# Patient Record
Sex: FEMALE | Race: WHITE | NOT HISPANIC OR LATINO | Employment: FULL TIME | URBAN - METROPOLITAN AREA
[De-identification: names, ages, dates, MRNs, and addresses within clinical notes are randomized per-mention and may not be internally consistent; named-entity substitution may affect disease eponyms.]

---

## 2017-01-06 ENCOUNTER — HOSPITAL ENCOUNTER (OUTPATIENT)
Dept: MRI IMAGING | Facility: HOSPITAL | Age: 37
Discharge: HOME | End: 2017-01-06
Attending: INTERNAL MEDICINE
Payer: COMMERCIAL

## 2017-01-06 DIAGNOSIS — E23.7 DISORDER OF PITUITARY GLAND (HCC): ICD-10-CM

## 2017-01-06 PROCEDURE — A9585 GADOBUTROL INJECTION: HCPCS | Performed by: INTERNAL MEDICINE

## 2017-01-06 PROCEDURE — 70553 MRI BRAIN STEM W/O & W/DYE: CPT

## 2017-01-06 RX ADMIN — GADOBUTROL 7 ML: 604.72 INJECTION INTRAVENOUS at 08:01

## 2017-01-09 ENCOUNTER — GENERIC CONVERSION - ENCOUNTER (OUTPATIENT)
Dept: OTHER | Facility: OTHER | Age: 37
End: 2017-01-09

## 2017-06-07 ENCOUNTER — ALLSCRIPTS OFFICE VISIT (OUTPATIENT)
Dept: OTHER | Facility: OTHER | Age: 37
End: 2017-06-07

## 2017-06-07 DIAGNOSIS — E23.7 DISORDER OF PITUITARY GLAND (HCC): ICD-10-CM

## 2018-01-08 DIAGNOSIS — E23.7 DISORDER OF PITUITARY GLAND (HCC): ICD-10-CM

## 2018-01-14 VITALS
HEIGHT: 72 IN | HEART RATE: 74 BPM | DIASTOLIC BLOOD PRESSURE: 80 MMHG | BODY MASS INDEX: 21.7 KG/M2 | WEIGHT: 160.19 LBS | SYSTOLIC BLOOD PRESSURE: 120 MMHG

## 2018-01-16 NOTE — RESULT NOTES
Message   Stable pituitary macroadenoma  Continue to monitor over time  Verified Results  * MRI BRAIN PITUITARY WO AND W CONTRAST [id number] 06:36AM [place] Order Number: [id number]     Test Name Result Flag Reference   MRI BRAIN PITUITARY WO AND W CONTRAST (Report)     This is a summary report  The complete report is available in the patient's medical record  If you cannot access the medical record, please contact the sending organization for a detailed fax or copy  MRI BRAIN AND SELLA WITH AND WITHOUT CONTRAST     INDICATION: Migraine headaches increasing in intensity and duration  History of Chiari I malformation and pituitary adenoma  COMPARISON: Prior outside studies have been made available including 8/27/2014 and 5/13/2015  TECHNIQUE:   Brain: Sagittal T1, axial T2  Axial FLAIR  Axial T1, Axial Gradient, Axial DWI  Axial T1 post contrast   Axial BRAVO post contrast       Sella: Sagittal T1, Coronal T1 pre-and-post contrast, coronal post contrast dynamic imaging  Coronal T2  Sagittal T1 post contrast    Targeted images of the sella were performed requiring additional time at acquisition and interpretation of approximately 25%   IV Contrast: Gadobutrol injection (SINGLE-DOSE) SOLN 7 mL Note: (SINGLE DOSE/MULTI DOSE) information refers to the container from which the contrast was acquired  Contrast was injected one time intravenously without immediate complication  IMAGE QUALITY: Diagnostic  FINDINGS:     BRAIN PARENCHYMA: Stable inferior displacement of the cerebellar tonsils below the foramen magnum consistent with Chiari I malformation  No significant mass effect upon the cervical medullary junction  Mild crowding of the foramen magnum  No abnormal   signal within the brainstem or cerebellar hemispheres  Stable supratentorial brain parenchyma with no discrete lesion of the cerebral hemispheres  Normal basal ganglia and basilar cisterns   Postcontrast imaging is normal      VENTRICLES: Normal      SELLA AND PITUITARY GLAND: The sella is normal in size  Once again identified is heterogeneous faintly hyperintense signal within the posterior aspect of the pituitary gland prior to administration of contrast  After administration of contrast there    is a poorly enhancing nodule posteriorly measuring 8 4 cm in AP diameter, 9 1 cm in craniocaudad dimension and 1 1 cm in transverse dimension, see series 12 image 6 and series 13 image 7  ORBITS: Normal      PARANASAL SINUSES: Retention cysts within the maxillary sinuses posteriorly and inferiorly  Otherwise clear sinuses  VASCULATURE: Evaluation of the major intracranial vasculature demonstrates appropriate flow voids  CALVARIUM AND SKULL BASE: Normal      EXTRACRANIAL SOFT TISSUES: Normal        IMPRESSION:     Stable Chiari I malformation  Irregularly-shaped poorly enhancing nodule in the posterior aspect of the pituitary gland is grossly unchanged compared to the most recent examination dated 8/27/2014 consistent with stable pituitary macroadenoma         Workstation performed: [id number]     Signed by:   Effie Beard DO   1/6/17

## 2018-01-20 ENCOUNTER — HOSPITAL ENCOUNTER (EMERGENCY)
Facility: HOSPITAL | Age: 38
Discharge: HOME | End: 2018-01-20
Attending: EMERGENCY MEDICINE | Admitting: EMERGENCY MEDICINE
Payer: COMMERCIAL

## 2018-01-20 VITALS
TEMPERATURE: 97.1 F | WEIGHT: 160 LBS | HEART RATE: 49 BPM | BODY MASS INDEX: 21.7 KG/M2 | OXYGEN SATURATION: 99 % | RESPIRATION RATE: 20 BRPM | DIASTOLIC BLOOD PRESSURE: 74 MMHG | SYSTOLIC BLOOD PRESSURE: 123 MMHG

## 2018-01-20 DIAGNOSIS — G43.909 MIGRAINE: Primary | ICD-10-CM

## 2018-01-20 PROCEDURE — 96375 TX/PRO/DX INJ NEW DRUG ADDON: CPT

## 2018-01-20 PROCEDURE — 96361 HYDRATE IV INFUSION ADD-ON: CPT

## 2018-01-20 PROCEDURE — 99283 EMERGENCY DEPT VISIT LOW MDM: CPT

## 2018-01-20 PROCEDURE — 96374 THER/PROPH/DIAG INJ IV PUSH: CPT

## 2018-01-20 RX ORDER — KETOROLAC TROMETHAMINE 30 MG/ML
15 INJECTION, SOLUTION INTRAMUSCULAR; INTRAVENOUS ONCE
Status: COMPLETED | OUTPATIENT
Start: 2018-01-20 | End: 2018-01-20

## 2018-01-20 RX ORDER — METOCLOPRAMIDE HYDROCHLORIDE 5 MG/ML
10 INJECTION INTRAMUSCULAR; INTRAVENOUS ONCE
Status: COMPLETED | OUTPATIENT
Start: 2018-01-20 | End: 2018-01-20

## 2018-01-20 RX ORDER — DIPHENHYDRAMINE HYDROCHLORIDE 50 MG/ML
25 INJECTION INTRAMUSCULAR; INTRAVENOUS ONCE
Status: COMPLETED | OUTPATIENT
Start: 2018-01-20 | End: 2018-01-20

## 2018-01-20 RX ADMIN — METOCLOPRAMIDE 10 MG: 5 INJECTION, SOLUTION INTRAMUSCULAR; INTRAVENOUS at 09:29

## 2018-01-20 RX ADMIN — SODIUM CHLORIDE 1000 ML: 0.9 INJECTION, SOLUTION INTRAVENOUS at 09:30

## 2018-01-20 RX ADMIN — DIPHENHYDRAMINE HYDROCHLORIDE 25 MG: 50 INJECTION, SOLUTION INTRAMUSCULAR; INTRAVENOUS at 09:29

## 2018-01-20 RX ADMIN — KETOROLAC TROMETHAMINE 15 MG: 30 INJECTION, SOLUTION INTRAMUSCULAR at 09:29

## 2018-01-20 NOTE — ED NOTES
Patient awake, reports a pain level of 3/10  MD at bedside       Ramiro Villafuerte RN  01/20/18 1540

## 2018-01-20 NOTE — ED PROVIDER NOTES
History  Chief Complaint   Patient presents with    Headache - Recurrent or Known Dx Migraines     Presents to ED with c/o migraine headache x 3 days unrelieved by Imitrex  Pt has hx of same  C/o nausea, sensitivity to light and sound  This is a 56-year-old female complaining of migraine for 3 days now  It started as left shoulder pain when she was driving and then it radiated up into the head an hour after that  She has had nausea and vomiting able to keep a little bit of food down but has tried her Imitrex without relief  No fevers  None       Past Medical History:   Diagnosis Date    [facility]formation ([facility] )     Migraine     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[facility] )     Psychiatric disorder        History reviewed  No pertinent surgical history  History reviewed  No pertinent family history  I have reviewed and agree with the history as documented  Social History   Substance Use Topics    Smoking status: Never Smoker    Smokeless tobacco: Never Used    Alcohol use No        Review of Systems   All other systems reviewed and are negative  Physical Exam  ED Triage Vitals [01/20/18 0908]   Temperature Pulse Respirations Blood Pressure SpO2   (!) 97 1 °F (36 2 °C) (!) 51 20 134/83 100 %      Temp Source Heart Rate Source Patient Position - Orthostatic VS BP Location FiO2 (%)   Tympanic Monitor Sitting -- --      Pain Score       Worst Possible Pain           Orthostatic Vital Signs  Vitals:    01/20/18 1015 01/20/18 1030 01/20/18 1045 01/20/18 1100   BP: 129/81 134/79 117/73 123/74   Pulse: (!) 44 (!) 48 (!) 48 (!) 49   Patient Position - Orthostatic VS:           Physical Exam   Constitutional: She is oriented to person, place, and time  She appears well-developed and well-nourished  HENT:   Right Ear: Tympanic membrane normal    Left Ear: Tympanic membrane normal    Nose: Right sinus exhibits frontal sinus tenderness  Left sinus exhibits frontal sinus tenderness     Mouth/Throat: Oropharynx is clear and moist    Photophobia noted   Eyes: Conjunctivae and EOM are normal  Pupils are equal, round, and reactive to light  Neck: Normal range of motion  Neck supple  No spinous process tenderness present  Cardiovascular: Normal rate, regular rhythm, normal heart sounds and intact distal pulses  Pulmonary/Chest: Effort normal and breath sounds normal  No respiratory distress  She has no wheezes  Abdominal: Soft  Bowel sounds are normal  She exhibits no distension  There is no tenderness  Musculoskeletal: Normal range of motion  Cervical back: She exhibits tenderness  She exhibits no bony tenderness  Back:    Tender left trapezius   Neurological: She is alert and oriented to person, place, and time  She has normal strength  No sensory deficit  GCS eye subscore is 4  GCS verbal subscore is 5  GCS motor subscore is 6  Skin: Skin is warm and dry  No rash noted  Psychiatric: She has a normal mood and affect  Nursing note and vitals reviewed        ED Medications  Medications   diphenhydrAMINE (BENADRYL) injection 25 mg (25 mg Intravenous Given 1/20/18 0929)   ketorolac (TORADOL) injection 15 mg (15 mg Intravenous Given 1/20/18 0929)   metoclopramide (REGLAN) injection 10 mg (10 mg Intravenous Given 1/20/18 0929)   sodium chloride 0 9 % bolus 1,000 mL (0 mL Intravenous Stopped 1/20/18 1125)       Diagnostic Studies  Results Reviewed     None                 No orders to display              Procedures  Procedures       Phone Contacts  ED Phone Contact    ED Course  ED Course                                MDM  Number of Diagnoses or Management Options  Migraine: new and does not require workup  Patient Progress  Patient progress: improved    CritCare Time    Disposition  Final diagnoses:   Migraine     Time reflects when diagnosis was documented in both MDM as applicable and the Disposition within this note     Time User Action Codes Description Comment    1/20/2018 11:12 AM Gracie Morejon Add [G43 369] Migraine       ED Disposition     ED Disposition Condition Comment    Discharge  [place] Night discharge to home/self care  Condition at discharge: Good        Follow-up Information    None       There are no discharge medications for this patient  No discharge procedures on file      ED Provider  Electronically Signed by           Yudelka Bains DO  01/20/18 1978

## 2018-01-20 NOTE — DISCHARGE INSTRUCTIONS
Migraine Headache   WHAT YOU SHOULD KNOW:   A migraine is a severe headache  The pain can be so severe that it interferes with your daily activities  A migraine can last a few hours up to several days  The exact cause of migraines is not known  It may be caused by changes in your body chemicals and extra sensitive nerves in your brain  AFTER YOU LEAVE:   Medicines:  Take medicine as soon as you feel a migraine begin  · Pain medicine: You may need medicine to take away or decrease pain  You may need a doctor's order for this medicine  Do not wait until the pain is severe before you take your medicine  · Migraine medicines: These are used to help prevent a migraine or stop it once it starts  · Antinausea medicine: This medicine may be given to calm your stomach and to help prevent vomiting  They can also help relieve pain  · Take your medicine as directed  Call your healthcare provider if you think your medicine is not helping or if you have side effects  Tell him if you are allergic to any medicine  Keep a list of the medicines, vitamins, and herbs you take  Include the amounts, and when and why you take them  Bring the list or the pill bottles to follow-up visits  Carry your medicine list with you in case of an emergency  Manage your symptoms:   · Rest:  Rest in a dark, quiet room  This will help decrease your pain  · Ice:  Ice helps decrease pain  Use an ice pack or put crushed ice in a plastic bag  Cover the ice pack with a towel and place it on your head where it hurts for 15 to 20 minutes every hour  · Heat:  Heat helps decrease pain and muscle spasms  Use a small towel dampened with warm water or a heating pad, or sit in a warm bath  Apply heat on the area for 20 to 30 minutes every 2 hours  You may alternate heat and ice  Keep a headache diary:  Write down when your migraines start and stop  Include your symptoms and what you were doing when a migraine began   Record what you ate or drank for 24 hours before the migraine started  Describe the pain and where it hurts  Keep track of what you did to treat your migraine and whether it worked  Follow up with your primary healthcare provider or neurologist as directed:  Bring your headache diary with you when you see your primary healthcare provider  Write down your questions so you remember to ask them during your visits  Prevent another migraine:   · Do not smoke: If you smoke, it is never too late to quit  Tobacco smoke can trigger a migraine  It can also cause heart disease, lung disease, cancer, and other health problems  Quitting smoking will improve your health and the health of those around you  If you smoke, ask for information about how to stop  · Do not drink alcohol:  Alcohol can trigger a migraine  It can also interfere with the medicines used to treat your migraine  · Get regular exercise:  Exercise may help prevent migraines  Talk to your primary healthcare provider about the best exercise plan for you  · Manage stress:  Stress may trigger a migraine  Learn new ways to relax, such as deep breathing  · Stick to a sleep schedule:  Go to bed and get up at the same time each day  · Eat regular meals:  Include healthy foods such as include fruit, vegetables, whole-grain breads, low-fat dairy products, beans, lean meat, and fish  Avoid trigger foods like chocolate, hard cheese, and red wine  Foods that contain gluten, nitrates, MSG, or artificial sweeteners may also trigger migraines  Caffeine, which is often used to treat migraines, can also trigger them  Contact your primary healthcare provider or neurologist if:   · You have a fever  · Your migraines interfere with your daily activities  · Your medicines or treatments stop working  · You have questions about your condition or care    Seek care immediately or call 911 if:   · You have a headache that seems different or much worse than your usual migraine headache  · You have a severe headache with a fever or a stiff neck  · You have new problems with speech, vision, balance, or movement  · You feel like you are going to faint, you become confused, or you have a seizure  © 2014 [address] is for End User's use only and may not be sold, redistributed or otherwise used for commercial purposes  All illustrations and images included in CareNotes® are the copyrighted property of A D A M , Inc  or Noe James  The above information is an  only  It is not intended as medical advice for individual conditions or treatments  Talk to your doctor, nurse or pharmacist before following any medical regimen to see if it is safe and effective for you

## 2018-02-13 ENCOUNTER — HOSPITAL ENCOUNTER (OUTPATIENT)
Dept: MRI IMAGING | Facility: HOSPITAL | Age: 38
Discharge: HOME | End: 2018-02-13
Payer: COMMERCIAL

## 2018-02-13 DIAGNOSIS — E23.7 DISORDER OF PITUITARY GLAND (HCC): ICD-10-CM

## 2018-02-13 PROCEDURE — 70553 MRI BRAIN STEM W/O & W/DYE: CPT

## 2018-02-13 PROCEDURE — A9585 GADOBUTROL INJECTION: HCPCS | Performed by: NURSE PRACTITIONER

## 2018-02-13 RX ADMIN — GADOBUTROL 7 ML: 604.72 INJECTION INTRAVENOUS at 08:10

## 2018-02-16 NOTE — PROGRESS NOTES
I attempted to call the patient on February 14, 2018 to discuss the MRI results with her  Pituitary adenoma is stable, however, results of the study suggest possible presence of another adenoma  Will attempt to contact her by phone again to discuss the results

## 2018-02-28 DIAGNOSIS — D35.2 PITUITARY ADENOMA (HCC): Primary | ICD-10-CM

## 2018-03-01 NOTE — PROGRESS NOTES
Neisha, yesterday  We talked about it but you were in the middle of 500 things  Salvador Po Salvador Po Salvador Po

## 2018-03-12 ENCOUNTER — TELEPHONE (OUTPATIENT)
Dept: ENDOCRINOLOGY | Facility: CLINIC | Age: 38
End: 2018-03-12

## 2018-03-12 NOTE — TELEPHONE ENCOUNTER
----- Message from [address] Claudia Villalpando sent at 3/12/2018  1:26 PM EDT -----  Regarding: Test Results Question  Contact: [phone]  Hello -  Please fax my lab work request to: [facility], [phone] so I can have it drawn      Thank you,  Yony Chan

## 2018-03-17 ENCOUNTER — LAB (OUTPATIENT)
Dept: LAB | Facility: HOSPITAL | Age: 38
End: 2018-03-17
Payer: COMMERCIAL

## 2018-03-17 DIAGNOSIS — D35.2 PITUITARY ADENOMA (HCC): ICD-10-CM

## 2018-03-17 PROCEDURE — 36415 COLL VENOUS BLD VENIPUNCTURE: CPT | Performed by: NURSE PRACTITIONER

## 2018-03-17 PROCEDURE — 82024 ASSAY OF ACTH: CPT | Performed by: NURSE PRACTITIONER

## 2018-03-17 PROCEDURE — 83520 IMMUNOASSAY QUANT NOS NONAB: CPT

## 2018-03-20 ENCOUNTER — TELEPHONE (OUTPATIENT)
Dept: ENDOCRINOLOGY | Facility: CLINIC | Age: 38
End: 2018-03-20

## 2018-03-20 LAB — ACTH PLAS-MCNC: 23.1 PG/ML (ref 7.2–63.3)

## 2018-03-20 NOTE — TELEPHONE ENCOUNTER
----- Message from [address] [facility] [address] John Rios sent at 3/12/2018  1:26 PM EDT -----  Regarding: Test Results Question  Contact: [phone]  Hello -  Please fax my lab work request to: [facility], [phone] so I can have it drawn      Thank you,  Patricia

## 2018-03-26 LAB — ALPHA SUBUNIT FREE SERPL-MCNC: 0.42 NG/ML

## 2018-06-05 RX ORDER — VENLAFAXINE HYDROCHLORIDE 150 MG/1
CAPSULE, EXTENDED RELEASE ORAL
COMMUNITY
End: 2018-06-08

## 2018-06-05 RX ORDER — MULTIVIT-MIN/IRON/FOLIC ACID/K 18-600-40
2000 CAPSULE ORAL DAILY
COMMUNITY

## 2018-06-05 RX ORDER — FLUTICASONE PROPIONATE 50 MCG
SPRAY, SUSPENSION (ML) NASAL
COMMUNITY
Start: 2014-04-09 | End: 2019-05-21 | Stop reason: SDUPTHER

## 2018-06-05 RX ORDER — ZONISAMIDE 100 MG/1
CAPSULE ORAL
COMMUNITY
End: 2018-06-08

## 2018-06-08 ENCOUNTER — OFFICE VISIT (OUTPATIENT)
Dept: ENDOCRINOLOGY | Facility: CLINIC | Age: 38
End: 2018-06-08
Payer: COMMERCIAL

## 2018-06-08 VITALS
DIASTOLIC BLOOD PRESSURE: 68 MMHG | HEIGHT: 72 IN | SYSTOLIC BLOOD PRESSURE: 128 MMHG | HEART RATE: 82 BPM | BODY MASS INDEX: 21.31 KG/M2 | WEIGHT: 157.3 LBS

## 2018-06-08 DIAGNOSIS — M81.0 OSTEOPOROSIS, UNSPECIFIED OSTEOPOROSIS TYPE, UNSPECIFIED PATHOLOGICAL FRACTURE PRESENCE: ICD-10-CM

## 2018-06-08 DIAGNOSIS — E23.6 PITUITARY MASS (HCC): Primary | ICD-10-CM

## 2018-06-08 PROCEDURE — 99214 OFFICE O/P EST MOD 30 MIN: CPT | Performed by: INTERNAL MEDICINE

## 2018-06-08 NOTE — PROGRESS NOTES
[facility] 41 [facility] o  female MRN: [id number]    Encounter: [id number]      Assessment/Plan     Assessment: This is a 41y o -year-old female with pituitary macroadenoma and osteoporosis  Plan:  1  Pituitary macroadenoma  There is a suggestion of a 2nd adenoma on the MRI  The laboratory testing was normal  Continue to monitor over time with a repeat MRI in about a year  Repeat laboratory testing was ordered  2   Osteoporosis-I have ordered a DEXA scan  I emphasized the importance of taking calcium and vitamin D on a consistent basis  CC:   Pituitary macroadenoma and osteoporosis    History of Present Illness     HPI:  16-year-old woman with pituitary macroadenoma for which she has been followed for at least 3 to 4 years  The adenoma has remained stable  On the most recent MRI, there is a suggestion of a 2nd adenoma  She denies any symptoms of hypo or hyperthyroidism  She does not menstruate due to [address]UD  She denies any visual difficulties  For the osteoporosis, she should be taking calcium and vitamin-D  She states that she has not been taking consistently  Review of Systems    Historical Information   Past Medical History:   Diagnosis Date    Chiari I malformation ([facility] )     Migraine     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[facility] )     Psychiatric disorder      History reviewed  No pertinent surgical history  Social History   History   Alcohol Use No     History   Drug Use No     History   Smoking Status    Never Smoker   Smokeless Tobacco    Never Used     Family History: History reviewed  No pertinent family history      Meds/Allergies   Current Outpatient Prescriptions   Medication Sig Dispense Refill    Cholecalciferol (VITAMIN D) 2000 units CAPS Take 2,000 Units by mouth daily        fluticasone (FLONASE) 50 mcg/act nasal spray into each nostril      levonorgestrel (MIRENA) 20 MCG/24HR IUD by Intrauterine route      Multiple Vitamin (MULTI-VITAMIN DAILY PO) Take by mouth       No current facility-administered medications for this visit  Allergies   Allergen Reactions    Gentamicin     Pollen Extract        Objective   Vitals: Blood pressure 128/68, pulse 82, height 6' (1 829 m), weight 71 4 kg (157 lb 4 8 oz), not currently breastfeeding  Physical Exam    The history was obtained from the review of the chart, patient  Lab Results:        Most recent pituitary related laboratory testing was normal       Imaging Studies:  Results for orders placed during the hospital encounter of 02/13/18   MRI brain pituitary wo and w contrast    Impression Stable appearance of the adenohypophysis with the presence of a posteriorly located adenoma  Question a homogenously enhancing abnormal right lateral aspect adenohypophysis  Workstation performed: [id number]            I have personally reviewed pertinent reports  Portions of the record may have been created with voice recognition software  Occasional wrong word or "sound a like" substitutions may have occurred due to the inherent limitations of voice recognition software  Read the chart carefully and recognize, using context, where substitutions have occurred

## 2018-10-22 NOTE — TELEPHONE ENCOUNTER
Letter for visual field screening faxed to [facility] in [address]    Called patient to let her know letter gas been faxed

## 2018-10-22 NOTE — TELEPHONE ENCOUNTER
----- Message from Darrion Melchor MD sent at 10/22/2018 11:01 AM EDT -----  Regarding: FW: Visit Follow-Up Question  Contact: [phone]  I agree with the ophthalmologist that she needs visual field testing  Please send a note to them regarding this  Darrion Melchor MD    ----- Message -----  From: Vijay Blair MA  Sent: 10/19/2018   3:12 PM  To: Darrion Melchor MD  Subject: FW: Visit Follow-Up Question                         ----- Message -----  From: Gilma Camarena  Sent: 10/19/2018   2:35 PM  To: , #  Subject: Visit Follow-Up Question                         Hello -  My eye doctor ([facility] [address] [place] [address]) is requesting a letter/note stating I should have the visual field screening (I believe) completed at my upcoming visit  If this is still accurate, could you please fax the request to them at ?     Thank you,  Dontae Julian

## 2019-04-19 ENCOUNTER — TELEPHONE (OUTPATIENT)
Dept: ENDOCRINOLOGY | Facility: CLINIC | Age: 39
End: 2019-04-19

## 2019-04-22 ENCOUNTER — HOSPITAL ENCOUNTER (OUTPATIENT)
Dept: MRI IMAGING | Facility: HOSPITAL | Age: 39
Discharge: HOME | End: 2019-04-22
Attending: INTERNAL MEDICINE
Payer: COMMERCIAL

## 2019-04-22 DIAGNOSIS — E23.6 PITUITARY MASS (HCC): ICD-10-CM

## 2019-04-22 PROCEDURE — A9585 GADOBUTROL INJECTION: HCPCS | Performed by: INTERNAL MEDICINE

## 2019-04-22 PROCEDURE — 70553 MRI BRAIN STEM W/O & W/DYE: CPT

## 2019-04-22 RX ADMIN — GADOBUTROL 7 ML: 604.72 INJECTION INTRAVENOUS at 08:18

## 2019-04-24 ENCOUNTER — TELEPHONE (OUTPATIENT)
Dept: ENDOCRINOLOGY | Facility: CLINIC | Age: 39
End: 2019-04-24

## 2019-05-13 ENCOUNTER — HOSPITAL ENCOUNTER (OUTPATIENT)
Dept: BONE DENSITY | Facility: IMAGING CENTER | Age: 39
Discharge: HOME | End: 2019-05-13
Payer: COMMERCIAL

## 2019-05-13 DIAGNOSIS — M81.0 OSTEOPOROSIS, UNSPECIFIED OSTEOPOROSIS TYPE, UNSPECIFIED PATHOLOGICAL FRACTURE PRESENCE: ICD-10-CM

## 2019-05-13 PROCEDURE — 77080 DXA BONE DENSITY AXIAL: CPT

## 2019-05-15 ENCOUNTER — TELEPHONE (OUTPATIENT)
Dept: ENDOCRINOLOGY | Facility: CLINIC | Age: 39
End: 2019-05-15

## 2019-05-21 ENCOUNTER — OFFICE VISIT (OUTPATIENT)
Dept: URGENT CARE | Facility: CLINIC | Age: 39
End: 2019-05-21

## 2019-05-21 VITALS
DIASTOLIC BLOOD PRESSURE: 70 MMHG | HEART RATE: 68 BPM | SYSTOLIC BLOOD PRESSURE: 110 MMHG | RESPIRATION RATE: 16 BRPM | TEMPERATURE: 97.5 F | OXYGEN SATURATION: 97 %

## 2019-05-21 DIAGNOSIS — J01.00 ACUTE NON-RECURRENT MAXILLARY SINUSITIS: Primary | ICD-10-CM

## 2019-05-21 DIAGNOSIS — B37.9 ANTIBIOTIC-INDUCED YEAST INFECTION: ICD-10-CM

## 2019-05-21 DIAGNOSIS — T36.95XA ANTIBIOTIC-INDUCED YEAST INFECTION: ICD-10-CM

## 2019-05-21 PROBLEM — R06.83 SNORING: Status: ACTIVE | Noted: 2017-11-27

## 2019-05-21 RX ORDER — FLUCONAZOLE 150 MG/1
150 TABLET ORAL ONCE
Qty: 1 TABLET | Refills: 0 | Status: SHIPPED | OUTPATIENT
Start: 2019-05-21 | End: 2019-05-21

## 2019-05-21 RX ORDER — FLUTICASONE PROPIONATE 50 MCG
2 SPRAY, SUSPENSION (ML) NASAL DAILY
Qty: 16 G | Refills: 0 | Status: SHIPPED | OUTPATIENT
Start: 2019-05-21 | End: 2022-03-29 | Stop reason: SDUPTHER

## 2019-05-21 RX ORDER — AMOXICILLIN AND CLAVULANATE POTASSIUM 875; 125 MG/1; MG/1
1 TABLET, FILM COATED ORAL EVERY 12 HOURS SCHEDULED
Qty: 14 TABLET | Refills: 0 | Status: SHIPPED | OUTPATIENT
Start: 2019-05-21 | End: 2019-05-28

## 2019-05-21 RX ORDER — SYRINGE WITH NEEDLE, 1 ML 28GX1/2"
SYRINGE, EMPTY DISPOSABLE MISCELLANEOUS
Refills: 2 | COMMUNITY
Start: 2019-04-16

## 2019-07-03 ENCOUNTER — TELEPHONE (OUTPATIENT)
Dept: ENDOCRINOLOGY | Facility: CLINIC | Age: 39
End: 2019-07-03

## 2019-07-03 NOTE — TELEPHONE ENCOUNTER
----- Message from Jah Marcos MD sent at 7/3/2019  9:06 AM EDT -----  The laboratory testing results are normal     Cortisol levels and thyroid levels are normal   The estradiol level is in the normal range  FSH is low but that may be due to the Mirena      Sent to my chart

## 2019-07-05 ENCOUNTER — OFFICE VISIT (OUTPATIENT)
Dept: ENDOCRINOLOGY | Facility: CLINIC | Age: 39
End: 2019-07-05
Payer: COMMERCIAL

## 2019-07-05 VITALS
WEIGHT: 159 LBS | BODY MASS INDEX: 21.54 KG/M2 | HEART RATE: 72 BPM | SYSTOLIC BLOOD PRESSURE: 120 MMHG | DIASTOLIC BLOOD PRESSURE: 78 MMHG | HEIGHT: 72 IN

## 2019-07-05 DIAGNOSIS — M81.0 OSTEOPOROSIS, UNSPECIFIED OSTEOPOROSIS TYPE, UNSPECIFIED PATHOLOGICAL FRACTURE PRESENCE: ICD-10-CM

## 2019-07-05 DIAGNOSIS — D35.2 PITUITARY ADENOMA (HCC): Primary | ICD-10-CM

## 2019-07-05 PROCEDURE — 99213 OFFICE O/P EST LOW 20 MIN: CPT | Performed by: INTERNAL MEDICINE

## 2019-07-05 NOTE — PROGRESS NOTES
Mia Current 42 y o  female MRN: [id number]    Encounter: [id number]      Assessment/Plan     Assessment: This is a 42y o -year-old female with pituitary adenoma and osteoporosis  Plan:  1  Pituitary adenoma has been stable for four years  Therefore, we have elected to not do further MRI imaging  She will need yearly pituitary testing with FSH, LH, estradiol, IGF-1, a m  Cortisol, TSH and free T4  She will have this done through her primary care physician  2  Osteoporosis from right hip fracture  She was treated with Forteo  Her bone density is normal   Continue to monitor DEXA scan every two years  We did discuss fall prevention  CC:   Pituitary adenoma    History of Present Illness     HPI:  68-year-old woman with pituitary adenoma for four years who presents for follow-up  Repeat MRI showed stable adenoma  Her pituitary laboratory testing is normal as well  She denies any headaches  For the osteoporosis, she continues on calcium and vitamin-D  She has not had any fractures  Review of Systems   Constitutional: Negative for chills and fever  Respiratory: Negative for shortness of breath  Cardiovascular: Negative for chest pain  Gastrointestinal: Negative for constipation, diarrhea, nausea and vomiting  Neurological: Negative for headaches  All other systems reviewed and are negative  Historical Information   Past Medical History:   Diagnosis Date    Chiari [facility] malformation ([facility] )     Migraine     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[facility] )     Psychiatric disorder      History reviewed  No pertinent surgical history  Social History   Social History     Substance and Sexual Activity   Alcohol Use No     Social History     Substance and Sexual Activity   Drug Use No     Social History     Tobacco Use   Smoking Status Never Smoker   Smokeless Tobacco Never Used     Family History: History reviewed  No pertinent family history      Meds/Allergies   Current Outpatient Medications   Medication Sig Dispense Refill    B-D ALLERGY SYRINGE 1CC/28G 28G X 1/2" 1 ML MISC USE WEEKLY FOR THREE allergy injections AS DIRECTED  2    CALCIUM-VITAMIN D PO Take 1,500 mg by mouth daily      levonorgestrel (MIRENA) 20 MCG/24HR IUD by Intrauterine route      Multiple Vitamin (MULTI-VITAMIN DAILY PO) Take by mouth      SUMAtriptan Succinate (IMITREX STATDOSE REFILL) 6 MG/0 5ML SOCT Inject 6 mg under the skin every 2 (two) hours as needed      Botulinum Toxin Type A (BOTOX) 200 units SOLR Inject 155 units into face and neck IM every 90 days   Cholecalciferol (VITAMIN D) 2000 units CAPS Take 2,000 Units by mouth daily        fluticasone (FLONASE) 50 mcg/act nasal spray 2 sprays into each nostril daily for 30 days 16 g 0     No current facility-administered medications for this visit  Allergies   Allergen Reactions    Bee Pollen     Gentamicin     Pollen Extract        Objective   Vitals: Blood pressure 120/78, pulse 72, height 6' (1 829 m), weight 72 1 kg (159 lb), not currently breastfeeding  Physical Exam   Constitutional: She is oriented to person, place, and time  She appears well-developed and well-nourished  No distress  HENT:   Head: Normocephalic and atraumatic  Mouth/Throat: Oropharynx is clear and moist and mucous membranes are normal  No oropharyngeal exudate  Eyes: Conjunctivae, EOM and lids are normal  Right eye exhibits no discharge  Left eye exhibits no discharge  No scleral icterus  Neck: Neck supple  No thyromegaly present  Cardiovascular: Normal rate, regular rhythm and normal heart sounds  Exam reveals no gallop and no friction rub  No murmur heard  Pulmonary/Chest: Effort normal and breath sounds normal  No respiratory distress  She has no wheezes  Abdominal: Soft  Bowel sounds are normal  She exhibits no distension  There is no tenderness  Musculoskeletal: Normal range of motion  She exhibits no edema, tenderness or deformity     Lymphadenopathy:        Head (right side): No occipital adenopathy present  Head (left side): No occipital adenopathy present  Right: No supraclavicular adenopathy present  Left: No supraclavicular adenopathy present  Neurological: She is alert and oriented to person, place, and time  No cranial nerve deficit  Skin: Skin is warm and intact  No rash noted  She is not diaphoretic  No erythema  Psychiatric: She has a normal mood and affect  Her behavior is normal    Vitals reviewed  The history was obtained from the review of the chart, patient  Lab Results:   Pituitary testing available in the laboratory section was reviewed  This is normal     Imaging Studies:            Results for orders placed during the hospital encounter of 05/13/19   DXA bone density spine hip and pelvis    Impression Bone mineral density within the expected range for age  According to 2015 ISCD Official Positions-Adult, for premenopausal women and men under the age of 48:    Z score greater than -2 0: Within the expected range for age  Z score of -2 0 or lower: Below the expected expected range for age  Workstation performed: [id number]         MRI BRAIN AND SELLA  WITH AND WITHOUT CONTRAST     INDICATION:  E23 7: Disorder of pituitary gland, unspecified migraine     COMPARISON: MR 2/13/2018     TECHNIQUE:  Brain: Axial diffusion-weighted imaging  Axial FLAIR and axial T2  Axial gradient  Axial T1 postcontrast Axial bravo postcontrast   Sella: Sagittal and coronal T1  Coronal T2  Sagittal and coronal T1 postcontrast with fat suppression  Coronal dynamic enhancement      Targeted images of the sella were performed requiring additional time at acquisition and interpretation of approximately 25%     IV Contrast:  7 mL of gadobutrol injection (MULTI-DOSE)      IMAGE QUALITY:  Diagnostic      FINDINGS:     BRAIN PARENCHYMA:  There is no discrete mass, mass effect or midline shift  No abnormal white matter signal identified  Brainstem and cerebellum demonstrate normal signal  There is no intracranial hemorrhage  There is no evidence of acute infarction and   diffusion imaging is unremarkable  Postcontrast imaging is normal      VENTRICLES:  Normal      SELLA AND PITUITARY GLAND:  Pituitary gland stable in appearance  Central inferior hypoenhancing mass approximately 12 mm in transverse, 8 mm in cephalocaudad and 8 mm in AP dimension  No extrasellar extension  Contents of the suprasellar cistern   normal in appearance      ORBITS:  Normal      PARANASAL SINUSES:  Progression of left maxillary sinus mucosal thickening since prior study with resolution of fluid level previously noted      VASCULATURE:  Evaluation of the major intracranial vasculature demonstrates appropriate flow voids      CALVARIUM AND SKULL BASE:  Chiari malformation      EXTRACRANIAL SOFT TISSUES:  Normal      IMPRESSION:     Stable MR appearance of the brain  12 x 8 x 8 mm hypoenhancing pituitary mass consistent with adenoma      Workstation performed: [id number]          I have personally reviewed pertinent reports  Portions of the record may have been created with voice recognition software  Occasional wrong word or "sound a like" substitutions may have occurred due to the inherent limitations of voice recognition software  Read the chart carefully and recognize, using context, where substitutions have occurred

## 2019-07-19 ENCOUNTER — TELEPHONE (OUTPATIENT)
Dept: ENDOCRINOLOGY | Facility: CLINIC | Age: 39
End: 2019-07-19

## 2019-07-19 NOTE — TELEPHONE ENCOUNTER
[facility] called asking if we can put updated labs in patient's chart because they are

## 2019-07-22 DIAGNOSIS — D35.2 PITUITARY ADENOMA (HCC): Primary | ICD-10-CM

## 2019-10-16 ENCOUNTER — APPOINTMENT (OUTPATIENT)
Dept: URGENT CARE | Facility: CLINIC | Age: 39
End: 2019-10-16

## 2019-10-16 ENCOUNTER — TRANSCRIBE ORDERS (OUTPATIENT)
Dept: URGENT CARE | Facility: CLINIC | Age: 39
End: 2019-10-16

## 2019-10-16 ENCOUNTER — APPOINTMENT (OUTPATIENT)
Dept: LAB | Facility: HOSPITAL | Age: 39
End: 2019-10-16

## 2019-10-16 DIAGNOSIS — M25.562 LEFT KNEE PAIN, UNSPECIFIED CHRONICITY: Primary | ICD-10-CM

## 2019-10-16 DIAGNOSIS — M25.562 LEFT KNEE PAIN, UNSPECIFIED CHRONICITY: ICD-10-CM

## 2019-10-16 DIAGNOSIS — Z01.89 ENCOUNTER FOR TOBACCO USE SCREENING: Primary | ICD-10-CM

## 2019-10-16 LAB
CRP SERPL QL: <3 MG/L
ERYTHROCYTE [SEDIMENTATION RATE] IN BLOOD: 2 MM/HOUR (ref 0–20)

## 2019-10-16 PROCEDURE — 80323 ALKALOIDS NOS: CPT | Performed by: NURSE PRACTITIONER

## 2019-10-16 PROCEDURE — 86618 LYME DISEASE ANTIBODY: CPT

## 2019-10-16 PROCEDURE — 36415 COLL VENOUS BLD VENIPUNCTURE: CPT

## 2019-10-16 PROCEDURE — G0480 DRUG TEST DEF 1-7 CLASSES: HCPCS | Performed by: NURSE PRACTITIONER

## 2019-10-16 PROCEDURE — 86140 C-REACTIVE PROTEIN: CPT

## 2019-10-16 PROCEDURE — 85652 RBC SED RATE AUTOMATED: CPT

## 2019-10-17 LAB — B BURGDOR IGG+IGM SER-ACNC: <0.91 ISR (ref 0–0.9)

## 2019-10-21 LAB
COTININE SERPL-MCNC: 170.3 NG/ML
NICOTINE SERPL-MCNC: 1.9 NG/ML

## 2019-10-24 ENCOUNTER — TELEPHONE (OUTPATIENT)
Dept: URGENT CARE | Facility: CLINIC | Age: 39
End: 2019-10-24

## 2019-11-13 ENCOUNTER — OFFICE VISIT (OUTPATIENT)
Dept: URGENT CARE | Facility: CLINIC | Age: 39
End: 2019-11-13

## 2019-11-13 VITALS
SYSTOLIC BLOOD PRESSURE: 112 MMHG | HEIGHT: 72 IN | BODY MASS INDEX: 21.7 KG/M2 | WEIGHT: 160.2 LBS | DIASTOLIC BLOOD PRESSURE: 72 MMHG

## 2019-11-13 DIAGNOSIS — Z23 NEED FOR INFLUENZA VACCINATION: ICD-10-CM

## 2019-11-13 DIAGNOSIS — Z00.8 ENCOUNTER FOR BIOMETRIC SCREENING: Primary | ICD-10-CM

## 2019-11-13 NOTE — PROGRESS NOTES
Biometric screening only     /72   Ht 6' (1 829 m)   Wt 72 7 kg (160 lb 3 2 oz)   BMI 21 73 kg/m²   Waist: 31

## 2020-10-08 ENCOUNTER — OFFICE VISIT (OUTPATIENT)
Dept: URGENT CARE | Facility: CLINIC | Age: 40
End: 2020-10-08

## 2020-10-08 VITALS
OXYGEN SATURATION: 100 % | HEART RATE: 93 BPM | SYSTOLIC BLOOD PRESSURE: 118 MMHG | DIASTOLIC BLOOD PRESSURE: 80 MMHG | TEMPERATURE: 98.5 F | RESPIRATION RATE: 16 BRPM

## 2020-10-08 DIAGNOSIS — Z23 NEED FOR IMMUNIZATION AGAINST INFLUENZA: ICD-10-CM

## 2020-10-08 DIAGNOSIS — I73.00 RAYNAUD'S PHENOMENON WITHOUT GANGRENE: ICD-10-CM

## 2020-10-08 DIAGNOSIS — J30.9 ALLERGIC RHINITIS, UNSPECIFIED SEASONALITY, UNSPECIFIED TRIGGER: Primary | ICD-10-CM

## 2020-10-08 RX ORDER — LORATADINE 10 MG/1
10 TABLET ORAL DAILY
COMMUNITY
Start: 2020-10-08

## 2021-04-06 DIAGNOSIS — Z23 ENCOUNTER FOR IMMUNIZATION: ICD-10-CM

## 2021-04-16 ENCOUNTER — IMMUNIZATIONS (OUTPATIENT)
Dept: FAMILY MEDICINE CLINIC | Facility: HOSPITAL | Age: 41
End: 2021-04-16

## 2021-04-16 DIAGNOSIS — Z23 ENCOUNTER FOR IMMUNIZATION: Primary | ICD-10-CM

## 2021-04-16 PROCEDURE — 91300 SARS-COV-2 / COVID-19 MRNA VACCINE (PFIZER-BIONTECH) 30 MCG: CPT

## 2021-04-16 PROCEDURE — 0001A SARS-COV-2 / COVID-19 MRNA VACCINE (PFIZER-BIONTECH) 30 MCG: CPT

## 2021-05-11 ENCOUNTER — IMMUNIZATIONS (OUTPATIENT)
Dept: FAMILY MEDICINE CLINIC | Facility: HOSPITAL | Age: 41
End: 2021-05-11

## 2021-05-11 DIAGNOSIS — Z23 ENCOUNTER FOR IMMUNIZATION: Primary | ICD-10-CM

## 2021-05-11 PROCEDURE — 0002A SARS-COV-2 / COVID-19 MRNA VACCINE (PFIZER-BIONTECH) 30 MCG: CPT

## 2021-05-11 PROCEDURE — 91300 SARS-COV-2 / COVID-19 MRNA VACCINE (PFIZER-BIONTECH) 30 MCG: CPT

## 2021-08-02 ENCOUNTER — OFFICE VISIT (OUTPATIENT)
Dept: URGENT CARE | Facility: CLINIC | Age: 41
End: 2021-08-02

## 2021-08-02 VITALS
HEART RATE: 82 BPM | SYSTOLIC BLOOD PRESSURE: 130 MMHG | DIASTOLIC BLOOD PRESSURE: 88 MMHG | RESPIRATION RATE: 16 BRPM | OXYGEN SATURATION: 97 % | TEMPERATURE: 98.1 F

## 2021-08-02 DIAGNOSIS — S46.912A SHOULDER STRAIN, LEFT, INITIAL ENCOUNTER: Primary | ICD-10-CM

## 2021-08-02 RX ORDER — NAPROXEN 500 MG/1
500 TABLET ORAL 2 TIMES DAILY WITH MEALS
Qty: 20 TABLET | Refills: 0 | Status: SHIPPED | OUTPATIENT
Start: 2021-08-02 | End: 2021-08-12

## 2021-08-02 NOTE — PROGRESS NOTES
[id number]        NAME: Michelle Parra is a 46 y o  female  : 1980    MRN: [id number]  DATE: 2021  TIME: 2:25 PM    Assessment and Plan   Shoulder strain, left, initial encounter [L36 226M]  1  Shoulder strain, left, initial encounter  naproxen (NAPROSYN) 500 mg tablet         Patient Instructions       Take Naproxen as prescribed, take with food  Rest your shoulder and arm  Do not lift anything >10 lbs  Recommend gentle shoulder stretches and advance slowly as tolerated  Massage recommended  Follow up with your PCP or return to the clinic if symptoms worsen or persist more than 3-5 days  Proceed to  ER if symptoms worsen  Chief Complaint     Chief Complaint   Patient presents with    Shoulder Pain     left         History of Present Illness       8 days ago she felt a "knot" in her L shoulder/ trapezius area  Muscular pain has worsened and now she feels tingling, discomfort, and pain in her L arm x 5 days  Pain to upper back as well  She awoke with the shoulder pain, thinks she slept on it incorrectly  No falls or injuries or new exercises/ straining  She tried tyleonol- did not help  Review of Systems   Review of Systems   Constitutional: Negative  Respiratory: Negative  Cardiovascular: Negative  Gastrointestinal: Negative  Musculoskeletal: Positive for back pain (left upper) and myalgias (pain to L shoulder, L upper back, L arm)  Negative for arthralgias, gait problem, joint swelling, neck pain and neck stiffness  Skin: Negative  Allergic/Immunologic: Positive for environmental allergies  Neurological: Negative  All other systems reviewed and are negative          Current Medications       Current Outpatient Medications:     B-D ALLERGY SYRINGE 1CC/28G 28G X 1/2" 1 ML MISC, USE WEEKLY FOR THREE allergy injections AS DIRECTED, Disp: , Rfl: 2    Botulinum Toxin Type A (BOTOX) 200 units SOLR, Inject 155 units into face and neck IM every 90 days , Disp: , Rfl:     CALCIUM-VITAMIN D PO, Take 1,500 mg by mouth daily, Disp: , Rfl:     Cholecalciferol (VITAMIN D) 2000 units CAPS, Take 2,000 Units by mouth daily  , Disp: , Rfl:     levonorgestrel (MIRENA) 20 MCG/24HR IUD, by Intrauterine route, Disp: , Rfl:     Multiple Vitamin (MULTI-VITAMIN DAILY PO), Take by mouth, Disp: , Rfl:     SUMAtriptan Succinate (IMITREX STATDOSE REFILL) 6 MG/0 5ML SOCT, Inject 6 mg under the skin every 2 (two) hours as needed, Disp: , Rfl:     fluticasone (FLONASE) 50 mcg/act nasal spray, 2 sprays into each nostril daily for 30 days, Disp: 16 g, Rfl: 0    loratadine (CLARITIN) 10 mg tablet, Take 10 mg by mouth daily, Disp: , Rfl:     naproxen (NAPROSYN) 500 mg tablet, Take 1 tablet (500 mg total) by mouth 2 (two) times a day with meals for 10 days, Disp: 20 tablet, Rfl: 0    Current Allergies     Allergies as of 08/02/2021 - Reviewed 08/02/2021   Allergen Reaction Noted    Bee pollen  11/04/2014    Pollen extract  11/04/2014    Gentamicin Eye Swelling 09/18/2014            The following portions of the patient's history were reviewed and updated as appropriate: allergies, current medications, past family history, past medical history, past social history, past surgical history and problem list      Past Medical History:   Diagnosis Date    Allergic rhinitis     Chiari I malformation (HCC)     Migraine     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[facility] )     Psychiatric disorder        History reviewed  No pertinent surgical history  History reviewed  No pertinent family history  Medications have been verified  Objective   /88 (BP Location: Right arm, Patient Position: Sitting, Cuff Size: Adult)   Pulse 82   Temp 98 1 °F (36 7 °C) (Tympanic)   Resp 16   SpO2 97%   No LMP recorded  (Menstrual status: Birth Control)  Physical Exam     Physical Exam  Vitals reviewed  Constitutional:       Appearance: Normal appearance  She is well-developed     HENT:      Head: Normocephalic and atraumatic  Neck:     Cardiovascular:      Rate and Rhythm: Normal rate and regular rhythm  Pulses: Normal pulses  Heart sounds: Normal heart sounds  Pulmonary:      Effort: Pulmonary effort is normal       Breath sounds: Normal breath sounds  Musculoskeletal:         General: Tenderness present  Right shoulder: Normal       Left shoulder: Tenderness (pain with movement of L arm ) present  No swelling, deformity, bony tenderness or crepitus  Normal range of motion  Normal strength  Normal pulse  Cervical back: Normal range of motion and neck supple  Tenderness (L trapezius area) present  No signs of trauma, rigidity or bony tenderness  Pain with movement present  Thoracic back: Normal       Lumbar back: Normal         Back:    Lymphadenopathy:      Cervical: No cervical adenopathy  Skin:     General: Skin is warm and dry  Capillary Refill: Capillary refill takes less than 2 seconds  Neurological:      General: No focal deficit present  Mental Status: She is alert and oriented to person, place, and time     Psychiatric:         Mood and Affect: Mood normal          Behavior: Behavior normal

## 2021-08-02 NOTE — PATIENT INSTRUCTIONS
Take Naproxen as prescribed, take with food  Rest your shoulder and arm  Do not lift anything >10 lbs  Recommend gentle shoulder stretches and advance slowly as tolerated  Massage recommended  Follow up with your PCP or return to the clinic if symptoms worsen or persist more than 3-5 days  Shoulder Pain   AMBULATORY CARE:   Shoulder pain  is a common problem that can affect your daily activities  Pain can be caused by a problem within your shoulder, such as soreness of a tendon or bursa  A tendon is a cord of tough tissue that connects your muscles to your bones  The bursa is a fluid-filled sac that acts as a cushion between a bone and a tendon  Shoulder pain may also be caused by pain that spreads to your shoulder from another part of your body  Seek care immediately if:   · You have severe pain  · You cannot move your arm or shoulder  · You have numbness or tingling in your shoulder or arm  Contact your healthcare provider if:   · Your pain gets worse or does not go away with treatment  · You have trouble moving your arm or shoulder  · You have questions or concerns about your condition or care  Treatment for shoulder pain  may include any of the following:  · Acetaminophen  decreases pain and fever  It is available without a doctor's order  Ask how much to take and how often to take it  Follow directions  Read the labels of all other medicines you are using to see if they also contain acetaminophen, or ask your doctor or pharmacist  Acetaminophen can cause liver damage if not taken correctly  Do not use more than 4 grams (4,000 milligrams) total of acetaminophen in one day  · NSAIDs , such as ibuprofen, help decrease swelling, pain, and fever  This medicine is available with or without a doctor's order  NSAIDs can cause stomach bleeding or kidney problems in certain people  If you take blood thinner medicine, always ask your healthcare provider if NSAIDs are safe for you   Always read the medicine label and follow directions  · A steroid injection  may help decrease pain and swelling  · Surgery  may be needed for long-term pain and loss of function  Manage your symptoms:   · Apply ice  on your shoulder for 20 to 30 minutes every 2 hours or as directed  Use an ice pack, or put crushed ice in a plastic bag  Cover it with a towel before you apply it to your shoulder  Ice helps prevent tissue damage and decreases swelling and pain  · Apply heat if ice does not help your symptoms  Apply heat on your shoulder for 20 to 30 minutes every 2 hours for as many days as directed  Heat helps decrease pain and muscle spasms  · Limit activities as directed  Try to avoid repeated overhead movements  · Go to physical or occupational therapy as directed  A physical therapist teaches you exercises to help improve movement and strength, and to decrease pain  An occupational therapist teaches you skills to help with your daily activities  Prevent shoulder pain:   · Maintain a good range of motion in your shoulder  Ask your healthcare provider which exercises you should do on a regular basis after you have healed  · Stretch and strengthen your shoulder  Use proper technique during exercises and sports  Follow up with your healthcare provider or orthopedist as directed:  Write down your questions so you remember to ask them during your visits  © Copyright [employer] 2021 Information is for End User's use only and may not be sold, redistributed or otherwise used for commercial purposes  All illustrations and images included in CareNotes® are the copyrighted property of [employer]  or [employer]  The above information is an  only  It is not intended as medical advice for individual conditions or treatments  Talk to your doctor, nurse or pharmacist before following any medical regimen to see if it is safe and effective for you

## 2021-08-02 NOTE — ASSESSMENT & PLAN NOTE
History and exam findings consistent with trapezius muscle strain  Recommend rest, ice, NSAIDs, and gentle stretching as tolerated  Recommend massage  Reasons to follow up reviewed with pt  All questions answered

## 2021-10-20 RX ORDER — PREDNISONE 20 MG/1
TABLET ORAL
COMMUNITY
Start: 2021-05-26

## 2021-10-21 ENCOUNTER — CONSULT (OUTPATIENT)
Dept: VASCULAR SURGERY | Facility: CLINIC | Age: 41
End: 2021-10-21
Payer: COMMERCIAL

## 2021-10-21 VITALS
HEIGHT: 72 IN | HEART RATE: 75 BPM | SYSTOLIC BLOOD PRESSURE: 126 MMHG | WEIGHT: 160.8 LBS | BODY MASS INDEX: 21.78 KG/M2 | DIASTOLIC BLOOD PRESSURE: 80 MMHG | TEMPERATURE: 98.1 F

## 2021-10-21 DIAGNOSIS — I73.00 RAYNAUD'S PHENOMENON WITHOUT GANGRENE: ICD-10-CM

## 2021-10-21 DIAGNOSIS — I83.892 SYMPTOMATIC VARICOSE VEINS, LEFT: Primary | ICD-10-CM

## 2021-10-21 PROCEDURE — 99204 OFFICE O/P NEW MOD 45 MIN: CPT | Performed by: PHYSICIAN ASSISTANT

## 2021-10-25 ENCOUNTER — TELEPHONE (OUTPATIENT)
Dept: RHEUMATOLOGY | Facility: CLINIC | Age: 41
End: 2021-10-25

## 2021-11-09 ENCOUNTER — HOSPITAL ENCOUNTER (OUTPATIENT)
Dept: NON INVASIVE DIAGNOSTICS | Facility: CLINIC | Age: 41
Discharge: HOME | End: 2021-11-09
Payer: COMMERCIAL

## 2021-11-09 DIAGNOSIS — I83.892 SYMPTOMATIC VARICOSE VEINS, LEFT: ICD-10-CM

## 2021-11-09 PROCEDURE — 93971 EXTREMITY STUDY: CPT | Performed by: SURGERY

## 2021-11-09 PROCEDURE — 93971 EXTREMITY STUDY: CPT

## 2021-11-18 ENCOUNTER — OFFICE VISIT (OUTPATIENT)
Dept: VASCULAR SURGERY | Facility: CLINIC | Age: 41
End: 2021-11-18
Payer: COMMERCIAL

## 2021-11-18 VITALS
WEIGHT: 164 LBS | DIASTOLIC BLOOD PRESSURE: 90 MMHG | SYSTOLIC BLOOD PRESSURE: 128 MMHG | BODY MASS INDEX: 22.21 KG/M2 | HEART RATE: 72 BPM | HEIGHT: 72 IN

## 2021-11-18 DIAGNOSIS — I83.892 SYMPTOMATIC VARICOSE VEINS, LEFT: Primary | ICD-10-CM

## 2021-11-18 PROCEDURE — 99213 OFFICE O/P EST LOW 20 MIN: CPT | Performed by: SURGERY

## 2021-11-18 RX ORDER — AMOXICILLIN 875 MG/1
875 TABLET, COATED ORAL EVERY 12 HOURS
COMMUNITY
Start: 2021-11-15 | End: 2022-03-29 | Stop reason: ALTCHOICE

## 2022-03-14 ENCOUNTER — OFFICE VISIT (OUTPATIENT)
Dept: RHEUMATOLOGY | Facility: CLINIC | Age: 42
End: 2022-03-14
Payer: COMMERCIAL

## 2022-03-14 VITALS
DIASTOLIC BLOOD PRESSURE: 81 MMHG | HEIGHT: 72 IN | SYSTOLIC BLOOD PRESSURE: 128 MMHG | WEIGHT: 154.4 LBS | TEMPERATURE: 97.7 F | HEART RATE: 71 BPM | BODY MASS INDEX: 20.91 KG/M2

## 2022-03-14 DIAGNOSIS — H04.123 DRY EYES: ICD-10-CM

## 2022-03-14 DIAGNOSIS — M81.0 OSTEOPOROSIS, UNSPECIFIED OSTEOPOROSIS TYPE, UNSPECIFIED PATHOLOGICAL FRACTURE PRESENCE: ICD-10-CM

## 2022-03-14 DIAGNOSIS — I87.2 VENOUS INSUFFICIENCY: ICD-10-CM

## 2022-03-14 DIAGNOSIS — I73.00 RAYNAUD'S PHENOMENON WITHOUT GANGRENE: Primary | ICD-10-CM

## 2022-03-14 PROCEDURE — 99204 OFFICE O/P NEW MOD 45 MIN: CPT | Performed by: INTERNAL MEDICINE

## 2022-03-14 RX ORDER — AMLODIPINE BESYLATE 5 MG/1
5 TABLET ORAL DAILY
Qty: 30 TABLET | Refills: 6 | Status: SHIPPED | OUTPATIENT
Start: 2022-03-14

## 2022-03-14 NOTE — PATIENT INSTRUCTIONS
Do labs  Start amlodipine 5mg daily    Return to clinic in 6 months    Raynaud Disease   WHAT YOU NEED TO KNOW:   What is Raynaud disease? Raynaud disease is a disorder that affects blood circulation, usually in the hands and feet  The arteries (blood vessels) that carry blood to your fingers, toes, ears, or nose tighten  This is often triggered by cold or emotional stress  The decrease in blood flow causes a lack of oxygen and changes in skin color  Over time, ulcers or gangrene (tissue death) may develop if frequent or severe attacks are not prevented  What causes Raynaud disease? · Primary Raynaud: The cause of primary Raynaud disease is unknown  This form usually affects both hands and feet  It is more common and is often milder than secondary Raynaud  It often affects women and first appears before the age of 27  · Secondary Raynaud: This form is also known as Raynaud phenomenon  Nicotine, alcohol, caffeine, and exposure to certain chemicals, such as vinyl chloride, can increase your risk for secondary Raynaud  The following are some common causes:     ? Inflammatory and autoimmune diseases:  Secondary Raynaud may be caused by certain diseases, such as scleroderma, lupus, Sjogren's syndrome, rheumatoid arthritis, and carpal tunnel syndrome  ? Medicines or illegal drugs:  Medicines used to treat high blood pressure, headaches, cancer, or colds may cause Raynaud disease  Use of illegal street drugs, such as amphetamines or cocaine, and some herbs may also cause Raynaud  ? Trauma or injuries:  Long-term use of vibrating tools, such as chain saws, grinders, or drills, may hurt nerves or blood vessels  Injuries to the hands or feet, such as a wrist fracture, surgery, or frostbite may also cause damage  What are the signs and symptoms of Raynaud disease? Your fingers or toes may first turn pale when you are exposed to cold or stressful situations   Due to the decrease in blood supply, your fingers or toes may then turn blue and may feel cold and numb  As blood supply returns to your fingers or toes, they become bright red  You may feel tingling, throbbing, or pain in your fingers or toes  Additional signs and symptoms may include the following:  · Primary Raynaud: The color changes usually affect both hands or feet in the same way and at the same time  You may develop thick or tight skin and brittle nails over time  Signs and symptoms are usually mild  · Secondary Raynaud: The color changes usually do not affect both hands or feet in the same way or at the same time  You may develop thick or tight skin and brittle nails over time  You may also develop skin ulcers  Your skin may develop gangrene if your fingers or toes do not get enough blood for a long period of time  Signs and symptoms are generally more severe  How is Raynaud disease diagnosed? · Nail fold capillary test:  Your healthcare provider may put a drop of oil on your nail folds (skin at the base of the fingernail)  The capillaries (tiny blood vessels) will then be checked under a microscope  · Blood tests: You may need blood taken to give healthcare providers information about how your body is working  The blood may be taken from your hand, arm, or IV  · Angiography: This test looks for problems with your arteries in your hands, arms, feet, and legs  Before the x-ray, a dye is put into a thin tube through a small cut in your groin  The dye helps the arteries show up better on these x-ray pictures  Tell the healthcare provider if you have ever had an allergic reaction to contrast dye  · Arterial doppler: An arterial doppler test is done to check blood flow through an artery  A small metal disc with gel on it is placed on your skin over the artery  You can hear a "whooshing" sound when the blood is flowing through the artery   An "X" may be marked on your skin where healthcare providers feel or hear the blood flowing best  Healthcare providers may need to check blood flow more than once  · X-rays:  Pictures of the bones, soft tissues, and other parts of your body may be taken  X-rays can show changes that will help healthcare providers learn if you have other diseases that may be causing Raynaud disease  How is Raynaud disease treated? Healthcare providers may tell you to avoid things or situations that could trigger an attack  If your daily activities are affected and symptoms are hard to control, you may need any of the following:  · Medicines:      ? Alpha blockers: These medicines work by stopping a hormone that tightens blood vessels  ? Antithrombotics: These are medicines that break apart clots and restore blood flow  ? Calcium channel blockers: These medicines relax and open up small blood vessels in your hands and feet  They may also help heal skin ulcers on your fingers or toes  ? Vasodilators: These medicines relax and widen the walls of the arteries  This may also help heal skin ulcers  · Surgery:  A surgery called sympathectomy may be done to cut sympathetic nerves  Sympathetic nerves in your hands and feet control the opening and narrowing of blood vessels in your skin  Surgery may also need to be done if affected parts have developed gangrene  What can I do to care for my skin if I have Raynaud disease? · Avoid putting too much pressure on your fingertips, such as typing or playing the piano  This kind of pressure may cause your blood vessels to narrow and trigger an attack  · Check your feet and hands daily for numb areas, thinning or thickening skin, black spots, cracks, brittle nails, or ulcers  · Keep your skin clean and dry to prevent an infection  Use lotion that contains lanolin on your hands and feet to keep the skin from drying or cracking  What can I do to prevent a Raynaud disease attack?    · Avoid cold temperatures when possible:  Wear gloves, scarves, or other winter garments during the winter months or before you go into cold rooms  · Limit alcohol and caffeine:  Men should limit alcohol to 2 drinks a day  Women should limit alcohol to 1 drink a day  A drink is 12 ounces of beer, 5 ounces of wine, or 1½ ounces of liquor  Try drinking decaffeinated coffee, tea, or soda  Ask your healthcare provider for more information about alcohol and caffeine  · Use caution with medicines:  Talk to your healthcare provider before you use medicines that may trigger an attack  These include certain medicines used for treating high blood pressure, headaches, cancer, or colds  · Exercise regularly: This prevents narrowing of the blood vessels and increases blood flow in your body  · Learn to manage stress:  Stress may trigger an attack  Try new ways to relax, such as deep breathing, meditation, or biofeedback  Biofeedback is a way to control how your body reacts to stress or pain  · Stop smoking:  If you smoke, it is never too late to quit  Smoking causes your blood vessels to narrow and may trigger an attack  Ask your healthcare provider for information if you need help quitting  What should I do during a Raynaud disease attack? · Get inside to warm yourself  · Wiggle your fingers or toes, or swing your arms around to increase circulation  Massage the affected parts  · Place your hands under your armpits or run warm water over the affected area  Do not  place the affected part in direct contact with hot water or a hot water bottle  The affected parts may be injured if they are not able to feel that the water is hot  · Get yourself out of stressful situations if possible  Deep breathing, meditation, or biofeedback may help decrease stress  Where can I find more information?    · [employer] and [address] [place] [address]  Phone: 2- [phone] - [phone]  Phone: 7- [phone] - [phone]  Web Address: [employer] [facility] gov    · [employer] [facility] and [id number]  [place] [address]  Phone: 7- [phone]  Web Address: [employer] no    When should I contact my healthcare provider? · You have new symptoms since your last appointment  · Your symptoms prevent you from doing your daily activities  · You need help to quit smoking  · You have questions or concerns about your condition or care  When should I seek immediate care? · You have many attacks even if you prevent cold, stress, or other triggers  · You have pain in your fingers or toes that does not go away or gets worse  · You have sores or ulcers on the tips of your fingers or toes that do not heal     · You have black spots on your fingers or toes  · Your hands or feet remain cold or discolored even after you warm them  CARE AGREEMENT:   You have the right to help plan your care  Learn about your health condition and how it may be treated  Discuss treatment options with your healthcare providers to decide what care you want to receive  You always have the right to refuse treatment  The above information is an  only  It is not intended as medical advice for individual conditions or treatments  Talk to your doctor, nurse or pharmacist before following any medical regimen to see if it is safe and effective for you  © Copyright [employer] 2022 Information is for End User's use only and may not be sold, redistributed or otherwise used for commercial purposes   All illustrations and images included in CareNotes® are the copyrighted property of [employer]  or [address] [place] [address] HALKAR

## 2022-03-14 NOTE — PROGRESS NOTES
Assessment and Plan:   Logan Ruiz is a 38 y o   female who presents as a Rheumatology consult referred by Masoud Beck* for evaluation of Raynaud syndrome  Besides the Raynaud symptoms, patient has no signs or symptoms consistent with an underlying connective tissue disease, but ordered CECILIA to officially rule-out  However, she does report history of her 22-year-old daughter passing away from bilateral pulmonary embolism, with history of left LE DVT seven years prior to that  With patient currently having left deep venous insufficiency, ordered antiphospholipid panel to rule out antiphospholipid syndrome  Patient is currently not on any anticoagulants  Her physical exam does not show any periungual erythema or livedoid rash  Since she does have some uncomfortableness with her Raynaud symptoms, started her on amlodipine 5 mg p o  daily  Right now her Raynaud symptoms seem consistent with primary Raynaud's syndrome rather than secondary to an underlying connective tissue disease  Of note, patient has history of fragility fractures of hip at a young age, completed 2 years of Forteo  Do autoimmune disease and antiphospholipid syndrome workup labs  Start amlodipine 5mg daily    Return to clinic in 6 months    Plan:  Diagnoses and all orders for this visit:    Raynaud's phenomenon without gangrene  -     Ambulatory referral to Rheumatology  -     CBC and differential  -     Comprehensive metabolic panel  -     C-reactive protein  -     Sedimentation rate, automated  -     CECILIA Screen w/ Reflex to Titer/Pattern  -     Cardiolipin antibody  -     Lupus anticoagulant  -     Beta-2 glycoprotein antibodies  -     amLODIPine (NORVASC) 5 mg tablet;  Take 1 tablet (5 mg total) by mouth daily    Dry eyes  -     Sjogren's Antibodies    Venous insufficiency  -     Cardiolipin antibody  -     Lupus anticoagulant  -     Beta-2 glycoprotein antibodies    Osteoporosis, unspecified osteoporosis type, unspecified pathological fracture presence  -     Vitamin D 25 hydroxy    Follow-up plan: RTC in 6 months        HPI  Denrosalinda Coe is a 38 y o   female who presents as a Rheumatology consult referred by Gerardo Medel* for evaluation of possible autoimmune disease given Raynaud's symptoms  Treated for osteoporosis in her 25s, broke right hip twice; was treated with Forteo for 2 years  Has two pituitary adenomas and Chiari malformation; monitored by Endocrinology  2-3 years ago, she started getting ebony swelling and numbness/tingling/pain  The symptoms are worse when she is standing, sitting, or propping; not walking around  Has been more consistent and more annoying over time  Uses compression stocking, which doesn't help  Went to vascular for left calf pain, diagnosed with deep venous incompetence; was told they can't do anything  Both toes and fingers change colors with the cold to purple/blue for several years; slightly uncomfortable  Admits to tingling/numbness but not pain; improves with gloves and socks  Gets dry eyes  Denies photosensitivity, rashes, oral or nasal ulcers, alopecia, h/o pericarditis or pleurisy, or h/o blood clots or miscarriages  Review of Systems  Review of Systems   Constitutional: Negative for fatigue, fever and unexpected weight change  HENT: Positive for sinus pressure and sinus pain  Negative for mouth sores  Eyes: Positive for pain  Dry eyes   Respiratory: Negative for cough and shortness of breath  Cardiovascular: Positive for leg swelling  Negative for chest pain  Gastrointestinal: Positive for constipation  Negative for abdominal pain and diarrhea  Endocrine: Positive for cold intolerance  Musculoskeletal: Positive for neck pain  Negative for arthralgias, back pain, joint swelling and myalgias  Skin: Positive for color change  Negative for rash  Neurological: Positive for numbness and headaches  Negative for weakness     Hematological: Negative for adenopathy  Psychiatric/Behavioral: Negative for sleep disturbance  Reviewed and agree  Allergies  Allergies   Allergen Reactions    Bee Pollen     Pollen Extract     Gentamicin Eye Swelling     Eye ointment only, tolerates flu shot with no problems  Home Medications    Current Outpatient Medications:     B-D ALLERGY SYRINGE 1CC/28G 28G X 1/2" 1 ML MISC, USE WEEKLY FOR THREE allergy injections AS DIRECTED, Disp: , Rfl: 2    CALCIUM-VITAMIN D PO, Take 1,500 mg by mouth daily, Disp: , Rfl:     levonorgestrel (MIRENA) 20 MCG/24HR IUD, by Intrauterine route, Disp: , Rfl:     Multiple Vitamin (MULTI-VITAMIN DAILY PO), Take by mouth daily , Disp: , Rfl:     SUMAtriptan Succinate (IMITREX STATDOSE REFILL) 6 MG/0 5ML SOCT, Inject 6 mg under the skin every 2 (two) hours as needed, Disp: , Rfl:     amLODIPine (NORVASC) 5 mg tablet, Take 1 tablet (5 mg total) by mouth daily, Disp: 30 tablet, Rfl: 6    amoxicillin (AMOXIL) 875 mg tablet, Take 875 mg by mouth every 12 (twelve) hours, Disp: , Rfl:     Botulinum Toxin Type A (BOTOX) 200 units SOLR, Inject 155 units into face and neck IM every 90 days  (Patient not taking: Reported on 10/21/2021), Disp: , Rfl:     Cholecalciferol (VITAMIN D) 2000 units CAPS, Take 2,000 Units by mouth daily   (Patient not taking: Reported on 11/18/2021 ), Disp: , Rfl:     fluticasone (FLONASE) 50 mcg/act nasal spray, 2 sprays into each nostril daily for 30 days (Patient taking differently: 2 sprays into each nostril as needed  ), Disp: 16 g, Rfl: 0    loratadine (CLARITIN) 10 mg tablet, Take 10 mg by mouth daily, Disp: , Rfl:     naproxen (NAPROSYN) 500 mg tablet, Take 1 tablet (500 mg total) by mouth 2 (two) times a day with meals for 10 days, Disp: 20 tablet, Rfl: 0    predniSONE 20 mg tablet, Take 3 pills a day for 3 days, then 2 pills a day for 3 days, then 1 pill a day for 3 days   (Patient not taking: Reported on 10/21/2021), Disp: , Rfl:     Past Medical History  Past Medical History:   Diagnosis Date    Allergic rhinitis     Chiari I malformation ([facility] [address] )     Migraine     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[facility] [address] )     Psychiatric disorder        Past Surgical History   History reviewed  No pertinent surgical history  Family History  History reviewed  No pertinent family history  Father - aortic aneurysm  Daughter passed away in 2019 with bilateral pulmonary embolism; first had DVT in 2012 after back surgery for scoliosis  Sister - one first trimester miscarriage    Social History  Occupation: , has a standing desk  Social History     Substance and Sexual Activity   Alcohol Use No     Social History     Substance and Sexual Activity   Drug Use No     Social History     Tobacco Use   Smoking Status Never Smoker   Smokeless Tobacco Never Used       Objective:  Vitals:    03/14/22 0836   BP: 128/81   Pulse: 71   Temp: 97 7 °F (36 5 °C)   Weight: 70 kg (154 lb 6 4 oz)   Height: 6' (1 829 m)       Physical Exam  Constitutional:       General: She is not in acute distress  Appearance: She is well-developed  HENT:      Head: Normocephalic and atraumatic  Eyes:      General: Lids are normal  No scleral icterus  Conjunctiva/sclera: Conjunctivae normal    Neck:      Thyroid: No thyromegaly  Cardiovascular:      Rate and Rhythm: Normal rate and regular rhythm  Heart sounds: S1 normal and S2 normal  No murmur heard  No friction rub  Pulmonary:      Effort: Pulmonary effort is normal  No tachypnea or respiratory distress  Breath sounds: Normal breath sounds  No wheezing, rhonchi or rales  Abdominal:      General: There is no distension  Palpations: Abdomen is soft  Tenderness: There is no abdominal tenderness  Musculoskeletal:         General: Tenderness present  Cervical back: Neck supple  No muscular tenderness        Comments: Left calf tenderness; slight bilateral wrist tenderness   Lymphadenopathy:      Head:      Right side of head: No submental or submandibular adenopathy  Left side of head: No submental or submandibular adenopathy  Cervical: No cervical adenopathy  Skin:     General: Skin is warm and dry  Findings: No rash  Nails: There is no clubbing  Comments: Bilateral distal lower extremity varicose veins; no periungual erythema or livedoid rash   Neurological:      Mental Status: She is alert  Sensory: No sensory deficit  Psychiatric:         Behavior: Behavior normal        Reviewed labs and imaging  Imaging:   VAS reflux lower limb duplex 11/9/21  LEFT LIMB:  Deep venous incompetence is noted in the proximal femoral vein and the deep  femoral vein  The great saphenous vein is competent  The great saphenous vein remains within the saphenous compartment in the thigh  The small saphenous vein is competent and does not communicate with the  popliteal vein  There is no evidence of incompetent perforators in the thigh or calf  There is no evidence of deep vein thrombosis in the CFV, the proximal PFV, the  femoral vein and the popliteal vein  Labs:   No visits with results within 6 Month(s) from this visit     Latest known visit with results is:   Appointment on 10/16/2019   Component Date Value Ref Range Status    Lyme IgG/IgM Ab 10/16/2019 <0 91  0 00 - 0 90 ISR Final                                    Negative         <0 91                                  Equivocal  0 91 - 1 09                                  Positive         >1 09    CRP 10/16/2019 <3 0  <3 0 mg/L Final    Sed Rate 10/16/2019 2  0 - 20 mm/hour Final

## 2022-03-29 ENCOUNTER — OFFICE VISIT (OUTPATIENT)
Dept: URGENT CARE | Facility: CLINIC | Age: 42
End: 2022-03-29

## 2022-03-29 VITALS
HEART RATE: 90 BPM | TEMPERATURE: 99 F | SYSTOLIC BLOOD PRESSURE: 120 MMHG | OXYGEN SATURATION: 98 % | RESPIRATION RATE: 16 BRPM | DIASTOLIC BLOOD PRESSURE: 80 MMHG

## 2022-03-29 DIAGNOSIS — M81.0 OSTEOPOROSIS, UNSPECIFIED OSTEOPOROSIS TYPE, UNSPECIFIED PATHOLOGICAL FRACTURE PRESENCE: ICD-10-CM

## 2022-03-29 DIAGNOSIS — I87.2 VENOUS INSUFFICIENCY: ICD-10-CM

## 2022-03-29 DIAGNOSIS — I73.00 RAYNAUD'S PHENOMENON WITHOUT GANGRENE: ICD-10-CM

## 2022-03-29 DIAGNOSIS — H04.123 DRY EYES: ICD-10-CM

## 2022-03-29 DIAGNOSIS — J01.00 ACUTE NON-RECURRENT MAXILLARY SINUSITIS: Primary | ICD-10-CM

## 2022-03-29 LAB
25(OH)D3 SERPL-MCNC: 23.7 NG/ML (ref 30–100)
ALBUMIN SERPL BCP-MCNC: 5 G/DL (ref 3.5–5)
ALP SERPL-CCNC: 20 U/L (ref 46–116)
ALT SERPL W P-5'-P-CCNC: 25 U/L (ref 12–78)
ANION GAP SERPL CALCULATED.3IONS-SCNC: 4 MMOL/L (ref 4–13)
AST SERPL W P-5'-P-CCNC: 24 U/L (ref 5–45)
BASOPHILS # BLD AUTO: 0.07 THOUSANDS/ΜL (ref 0–0.1)
BASOPHILS NFR BLD AUTO: 1 % (ref 0–1)
BILIRUB SERPL-MCNC: 0.7 MG/DL (ref 0.2–1)
BUN SERPL-MCNC: 13 MG/DL (ref 5–25)
CALCIUM SERPL-MCNC: 10 MG/DL (ref 8.3–10.1)
CHLORIDE SERPL-SCNC: 105 MMOL/L (ref 100–108)
CO2 SERPL-SCNC: 26 MMOL/L (ref 21–32)
CREAT SERPL-MCNC: 0.96 MG/DL (ref 0.6–1.3)
CRP SERPL QL: <3 MG/L
EOSINOPHIL # BLD AUTO: 0.04 THOUSAND/ΜL (ref 0–0.61)
EOSINOPHIL NFR BLD AUTO: 1 % (ref 0–6)
ERYTHROCYTE [DISTWIDTH] IN BLOOD BY AUTOMATED COUNT: 12.2 % (ref 11.6–15.1)
ERYTHROCYTE [SEDIMENTATION RATE] IN BLOOD: 6 MM/HOUR (ref 0–19)
GFR SERPL CREATININE-BSD FRML MDRD: 74 ML/MIN/1.73SQ M
GLUCOSE P FAST SERPL-MCNC: 86 MG/DL (ref 65–99)
HCT VFR BLD AUTO: 47.7 % (ref 34.8–46.1)
HGB BLD-MCNC: 15.5 G/DL (ref 11.5–15.4)
IMM GRANULOCYTES # BLD AUTO: 0.02 THOUSAND/UL (ref 0–0.2)
IMM GRANULOCYTES NFR BLD AUTO: 0 % (ref 0–2)
LYMPHOCYTES # BLD AUTO: 1.13 THOUSANDS/ΜL (ref 0.6–4.47)
LYMPHOCYTES NFR BLD AUTO: 18 % (ref 14–44)
MCH RBC QN AUTO: 33.1 PG (ref 26.8–34.3)
MCHC RBC AUTO-ENTMCNC: 32.5 G/DL (ref 31.4–37.4)
MCV RBC AUTO: 102 FL (ref 82–98)
MONOCYTES # BLD AUTO: 0.46 THOUSAND/ΜL (ref 0.17–1.22)
MONOCYTES NFR BLD AUTO: 7 % (ref 4–12)
NEUTROPHILS # BLD AUTO: 4.68 THOUSANDS/ΜL (ref 1.85–7.62)
NEUTS SEG NFR BLD AUTO: 73 % (ref 43–75)
NRBC BLD AUTO-RTO: 0 /100 WBCS
PLATELET # BLD AUTO: 241 THOUSANDS/UL (ref 149–390)
PMV BLD AUTO: 11.4 FL (ref 8.9–12.7)
POTASSIUM SERPL-SCNC: 4.3 MMOL/L (ref 3.5–5.3)
PROT SERPL-MCNC: 8.8 G/DL (ref 6.4–8.2)
RBC # BLD AUTO: 4.68 MILLION/UL (ref 3.81–5.12)
SODIUM SERPL-SCNC: 135 MMOL/L (ref 136–145)
WBC # BLD AUTO: 6.4 THOUSAND/UL (ref 4.31–10.16)

## 2022-03-29 PROCEDURE — 86147 CARDIOLIPIN ANTIBODY EA IG: CPT | Performed by: NURSE PRACTITIONER

## 2022-03-29 PROCEDURE — 82306 VITAMIN D 25 HYDROXY: CPT | Performed by: NURSE PRACTITIONER

## 2022-03-29 PROCEDURE — 80053 COMPREHEN METABOLIC PANEL: CPT | Performed by: NURSE PRACTITIONER

## 2022-03-29 PROCEDURE — 85705 THROMBOPLASTIN INHIBITION: CPT | Performed by: NURSE PRACTITIONER

## 2022-03-29 PROCEDURE — 85652 RBC SED RATE AUTOMATED: CPT | Performed by: NURSE PRACTITIONER

## 2022-03-29 PROCEDURE — 86235 NUCLEAR ANTIGEN ANTIBODY: CPT | Performed by: NURSE PRACTITIONER

## 2022-03-29 PROCEDURE — 85613 RUSSELL VIPER VENOM DILUTED: CPT | Performed by: NURSE PRACTITIONER

## 2022-03-29 PROCEDURE — 86140 C-REACTIVE PROTEIN: CPT | Performed by: NURSE PRACTITIONER

## 2022-03-29 PROCEDURE — 86038 ANTINUCLEAR ANTIBODIES: CPT | Performed by: NURSE PRACTITIONER

## 2022-03-29 PROCEDURE — 85732 THROMBOPLASTIN TIME PARTIAL: CPT | Performed by: NURSE PRACTITIONER

## 2022-03-29 PROCEDURE — 85025 COMPLETE CBC W/AUTO DIFF WBC: CPT | Performed by: NURSE PRACTITIONER

## 2022-03-29 PROCEDURE — 85670 THROMBIN TIME PLASMA: CPT | Performed by: NURSE PRACTITIONER

## 2022-03-29 PROCEDURE — 86146 BETA-2 GLYCOPROTEIN ANTIBODY: CPT | Performed by: NURSE PRACTITIONER

## 2022-03-29 RX ORDER — FLUTICASONE PROPIONATE 50 MCG
2 SPRAY, SUSPENSION (ML) NASAL DAILY
Qty: 16 G | Refills: 0 | Status: SHIPPED | OUTPATIENT
Start: 2022-03-29 | End: 2022-04-28

## 2022-03-29 RX ORDER — AMOXICILLIN AND CLAVULANATE POTASSIUM 875; 125 MG/1; MG/1
1 TABLET, FILM COATED ORAL EVERY 12 HOURS SCHEDULED
Qty: 14 TABLET | Refills: 0 | Status: SHIPPED | OUTPATIENT
Start: 2022-03-29 | End: 2022-04-05

## 2022-03-29 NOTE — PATIENT INSTRUCTIONS
Take Augmentin as prescribed, take with food  Continue Flonase nasal spray daily  Increase your water intake and use nasal saline rinses  Recommend Mucinex as needed  Follow up with your PCP or return to the clinic if symptoms worsen or persist more than 3-5 days  Sinusitis, Ambulatory Care   GENERAL INFORMATION:   Sinusitis  is inflammation or infection of your sinuses  It is most often caused by a virus  Acute sinusitis may last up to 12 weeks  Chronic sinusitis lasts longer than 12 weeks  Recurrent sinusitis is when you have 3 or more episodes of sinusitis in 1 year  Common symptoms include the following:   · Fever    · Pain, pressure, redness, or swelling around the forehead, cheeks, or eyes    · Thick yellow or green discharge from your nose    · Tenderness when you touch your face over your sinuses    · Dry cough that happens mostly at night or when you lie down    · Headache and face pain that is worse when you lean forward    · Teeth pain or pain when you chew  Seek immediate care for the following symptoms:   · Vision changes such as double vision    · Confusion or trouble thinking clearly    · Headache and stiff neck    · Trouble breathing  Treatment for sinusitis  may include medicines to relieve nasal and sinus congestion or to decrease pain and fever  Ask your healthcare provider which medicines you should take and how much is safe  Manage sinusitis:   · Drink liquids as directed  Ask your healthcare provider how much liquid to drink each day and which liquids are best for you  Liquids will help loosen and drain the mucus in your sinuses  · Breathe in steam   Heat a bowl of water until you see steam  Lean over the bowl and make a tent over your head with a large towel  Breathe deeply for about 20 minutes  Be careful not to get too close to the steam or burn yourself  Do this 3 times a day  You can also breathe deeply when you take a hot shower  · Rinse your sinuses    Use a sinus rinse device to rinse your nasal passages with a saline (salt water) solution  This will help thin the mucus in your nose and rinse away pollen and dirt  It will also help reduce swelling so you can breathe normally  Ask how often to do this  · Use heat on your sinuses  to decrease pain  Apply heat for 15 to 20 minutes every hour for as many days as directed  · Sleep with your head elevated  Place an extra pillow under your head before you go to sleep to help your sinuses drain  · Do not smoke and avoid secondhand smoke  If you smoke, it is never too late to quit  Ask for information about how to stop smoking if you need help  Prevent the spread of germs that cause sinusitis:  Wash your hands often with soap and water  Wash your hands after you use the bathroom, change a child's diaper, or sneeze  Wash your hands before you prepare or eat food  Follow up with your healthcare provider as directed:  Write down your questions so you remember to ask them during your visits  CARE AGREEMENT:   You have the right to help plan your care  Learn about your health condition and how it may be treated  Discuss treatment options with your caregivers to decide what care you want to receive  You always have the right to refuse treatment  The above information is an  only  It is not intended as medical advice for individual conditions or treatments  Talk to your doctor, nurse or pharmacist before following any medical regimen to see if it is safe and effective for you  © 2014 [address] is for End User's use only and may not be sold, redistributed or otherwise used for commercial purposes  All illustrations and images included in CareNotes® are the copyrighted property of [employer]  or [facility]

## 2022-03-29 NOTE — PROGRESS NOTES
[facility]        NAME: Viola Boucher is a 38 y o  female  : 1980    MRN: [id number]  DATE: 2022  TIME: 10:28 AM    Assessment and Plan   Acute non-recurrent maxillary sinusitis [J01 00]  1  Acute non-recurrent maxillary sinusitis  amoxicillin-clavulanate (AUGMENTIN) 875-125 mg per tablet    fluticasone (FLONASE) 50 mcg/act nasal spray   2  Osteoporosis, unspecified osteoporosis type, unspecified pathological fracture presence  Vitamin D 25 hydroxy   3  Raynaud's phenomenon without gangrene  CBC and differential    Comprehensive metabolic panel    C-reactive protein    Sedimentation rate, automated    CECILIA Screen w/ Reflex to Titer/Pattern    Cardiolipin antibody    Lupus anticoagulant    Beta-2 glycoprotein antibodies   4  Venous insufficiency  Cardiolipin antibody    Lupus anticoagulant    Beta-2 glycoprotein antibodies   5  Dry eyes  Sjogren's Antibodies         Patient Instructions       Take Augmentin as prescribed, take with food  Continue Flonase nasal spray daily  Increase your water intake and use nasal saline rinses  Recommend Mucinex as needed  Follow up with your PCP or return to the clinic if symptoms worsen or persist more than 3-5 days  Proceed to  ER if symptoms worsen  Chief Complaint     Chief Complaint   Patient presents with    Sinusitis         History of Present Illness       Sinus pressure, pressure in ears x 1 5 months  Post-nasal drip, sore throat, mild cough due to PND  No fever or chills  No chest congestion  She has been taking tylenol sinus- mild improvement  No known sick contacts  She has had her COVID vaccines  Hx frequent sinus infections, worse during spring and fall  She has been getting allergy shots  Pt has labwork ordered by her Rheumatologist   She has been fasting  labwork drawn and sent to the lab  Review of Systems   Review of Systems   Constitutional: Negative  Negative for chills and fever     HENT: Positive for congestion, ear pain (pressure in ears), postnasal drip, rhinorrhea, sinus pressure (maxillary), sinus pain and sore throat  Negative for ear discharge and trouble swallowing  Eyes: Negative  Negative for photophobia, pain, discharge, redness, itching and visual disturbance  Respiratory: Positive for cough (mild, secondary to PND)  Negative for shortness of breath and wheezing  Cardiovascular: Negative  Negative for chest pain and palpitations  Musculoskeletal: Negative  Skin: Negative  Negative for rash  Allergic/Immunologic: Positive for environmental allergies  All other systems reviewed and are negative  Current Medications       Current Outpatient Medications:     amLODIPine (NORVASC) 5 mg tablet, Take 1 tablet (5 mg total) by mouth daily, Disp: 30 tablet, Rfl: 6    CALCIUM-VITAMIN D PO, Take 1,500 mg by mouth daily, Disp: , Rfl:     Cholecalciferol (VITAMIN D) 2000 units CAPS, Take 2,000 Units by mouth daily  , Disp: , Rfl:     levonorgestrel (MIRENA) 20 MCG/24HR IUD, by Intrauterine route, Disp: , Rfl:     Multiple Vitamin (MULTI-VITAMIN DAILY PO), Take by mouth daily , Disp: , Rfl:     SUMAtriptan Succinate (IMITREX STATDOSE REFILL) 6 MG/0 5ML SOCT, Inject 6 mg under the skin every 2 (two) hours as needed, Disp: , Rfl:     amoxicillin-clavulanate (AUGMENTIN) 875-125 mg per tablet, Take 1 tablet by mouth every 12 (twelve) hours for 7 days, Disp: 14 tablet, Rfl: 0    B-D ALLERGY SYRINGE 1CC/28G 28G X 1/2" 1 ML MISC, USE WEEKLY FOR THREE allergy injections AS DIRECTED (Patient not taking: Reported on 3/29/2022), Disp: , Rfl: 2    Botulinum Toxin Type A (BOTOX) 200 units SOLR, Inject 155 units into face and neck IM every 90 days   (Patient not taking: Reported on 3/29/2022), Disp: , Rfl:     fluticasone (FLONASE) 50 mcg/act nasal spray, 2 sprays into each nostril daily, Disp: 16 g, Rfl: 0    loratadine (CLARITIN) 10 mg tablet, Take 10 mg by mouth daily, Disp: , Rfl:    naproxen (NAPROSYN) 500 mg tablet, Take 1 tablet (500 mg total) by mouth 2 (two) times a day with meals for 10 days, Disp: 20 tablet, Rfl: 0    predniSONE 20 mg tablet, Take 3 pills a day for 3 days, then 2 pills a day for 3 days, then 1 pill a day for 3 days  (Patient not taking: Reported on 10/21/2021), Disp: , Rfl:     Current Allergies     Allergies as of 03/29/2022 - Reviewed 03/29/2022   Allergen Reaction Noted    Bee pollen  11/04/2014    Pollen extract  11/04/2014    Gentamicin Eye Swelling 09/18/2014            The following portions of the patient's history were reviewed and updated as appropriate: allergies, current medications, past family history, past medical history, past social history, past surgical history and problem list      Past Medical History:   Diagnosis Date    Allergic rhinitis     Chiari [facility] malformation ([facility] [address] )     Migraine     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[facility] [address] [facility]     Psychiatric disorder        History reviewed  No pertinent surgical history  History reviewed  No pertinent family history  Medications have been verified  Objective   /80 (BP Location: Right arm, Patient Position: Sitting, Cuff Size: Adult)   Pulse 90   Temp 99 °F (37 2 °C) (Tympanic)   Resp 16   SpO2 98%   No LMP recorded  (Menstrual status: Birth Control)  Physical Exam     Physical Exam  Vitals and nursing note reviewed  Constitutional:       General: She is not in acute distress  Appearance: Normal appearance  She is well-developed  HENT:      Head: Normocephalic and atraumatic  Jaw: There is normal jaw occlusion  Right Ear: Hearing, ear canal and external ear normal  Tenderness present  A middle ear effusion is present  No mastoid tenderness  Tympanic membrane is not erythematous or bulging  Left Ear: Hearing, ear canal and external ear normal  Tenderness present  A middle ear effusion is present  No mastoid tenderness  Tympanic membrane is not erythematous or bulging  Ears:      Comments: Pressure in ears     Nose: Mucosal edema, congestion and rhinorrhea present  Rhinorrhea is purulent  Right Turbinates: Swollen  Left Turbinates: Swollen  Right Sinus: Maxillary sinus tenderness present  Left Sinus: Maxillary sinus tenderness present  Mouth/Throat:      Lips: Pink  Mouth: Mucous membranes are moist       Dentition: Normal dentition  Pharynx: Uvula midline  Posterior oropharyngeal erythema present  No oropharyngeal exudate  Tonsils: No tonsillar exudate  1+ on the right  1+ on the left  Eyes:      Conjunctiva/sclera: Conjunctivae normal       Pupils: Pupils are equal, round, and reactive to light  Cardiovascular:      Rate and Rhythm: Normal rate and regular rhythm  Pulses: Normal pulses  Heart sounds: Normal heart sounds  Pulmonary:      Effort: Pulmonary effort is normal       Breath sounds: Normal breath sounds  Musculoskeletal:         General: Normal range of motion  Cervical back: Normal range of motion and neck supple  Skin:     General: Skin is warm and dry  Capillary Refill: Capillary refill takes less than 2 seconds  Neurological:      General: No focal deficit present  Mental Status: She is alert and oriented to person, place, and time     Psychiatric:         Mood and Affect: Mood normal          Behavior: Behavior normal

## 2022-03-29 NOTE — ASSESSMENT & PLAN NOTE
History and exam findings consistent with sinusitis  Rx written for Augmentin and flonase  Symptomatic care also recommended  Reasons to follow up reviewed with pt

## 2022-03-30 LAB
B2 GLYCOPROT1 IGA SERPL IA-ACNC: 1.5
B2 GLYCOPROT1 IGG SERPL IA-ACNC: 0.9
B2 GLYCOPROT1 IGM SERPL IA-ACNC: 3.7
CARDIOLIPIN IGA SER IA-ACNC: 1.9
CARDIOLIPIN IGG SER IA-ACNC: 1.2
CARDIOLIPIN IGM SER IA-ACNC: 2.1
ENA SS-A AB SER-ACNC: <0.2 AI (ref 0–0.9)
ENA SS-B AB SER-ACNC: <0.2 AI (ref 0–0.9)
RYE IGE QN: NEGATIVE

## 2022-04-01 LAB
APTT SCREEN TO CONFIRM RATIO: 1 RATIO (ref 0–1.34)
CONFIRM APTT/NORMAL: 38.2 SEC (ref 0–47.6)
LA PPP-IMP: NORMAL
SCREEN APTT: 31.7 SEC (ref 0–51.9)
SCREEN DRVVT: 31.1 SEC (ref 0–47)
THROMBIN TIME: 19.4 SEC (ref 0–23)

## 2022-04-12 ENCOUNTER — OFFICE VISIT (OUTPATIENT)
Dept: URGENT CARE | Facility: CLINIC | Age: 42
End: 2022-04-12

## 2022-04-12 VITALS
HEART RATE: 78 BPM | RESPIRATION RATE: 16 BRPM | SYSTOLIC BLOOD PRESSURE: 126 MMHG | OXYGEN SATURATION: 97 % | TEMPERATURE: 98.5 F | DIASTOLIC BLOOD PRESSURE: 80 MMHG

## 2022-04-12 DIAGNOSIS — T36.95XA ANTIBIOTIC-INDUCED YEAST INFECTION: Primary | ICD-10-CM

## 2022-04-12 DIAGNOSIS — B37.9 ANTIBIOTIC-INDUCED YEAST INFECTION: Primary | ICD-10-CM

## 2022-04-12 RX ORDER — FLUCONAZOLE 150 MG/1
150 TABLET ORAL ONCE
Qty: 1 TABLET | Refills: 0 | Status: SHIPPED | OUTPATIENT
Start: 2022-04-12 | End: 2022-04-12

## 2022-04-12 NOTE — PROGRESS NOTES
[facility]        NAME: Allyson Garcia is a 38 y o  female  : 1980    MRN: [id number]  DATE: 2022  TIME: 11:20 AM    Assessment and Plan   Antibiotic-induced yeast infection [B37 9, T36 95XA]  1  Antibiotic-induced yeast infection  fluconazole (DIFLUCAN) 150 mg tablet         Patient Instructions       Take diflucan as prescribed  Keep your skin dry and wear cotton clothing  Follow up with your PCP or return to the clinic if symptoms worsen or persist more than 3-5 days  Proceed to  ER if symptoms worsen  Chief Complaint     Chief Complaint   Patient presents with    Vaginitis     4 days          History of Present Illness       Pt c/o vaginal yeast infection symptoms x 4 days  She reports vaginal itching, burning, thick discharge  Consistent with previous vaginal yeast infections  She recently completed a course of antibiotics for a sinus infection  Hx vaginal candidiasis after taking abx in the past        Review of Systems   Review of Systems   Constitutional: Negative  Respiratory: Negative  Cardiovascular: Negative  Gastrointestinal: Negative for abdominal pain, constipation, diarrhea, nausea and vomiting  Genitourinary: Positive for vaginal discharge  Negative for decreased urine volume, difficulty urinating, dyspareunia, flank pain, frequency, hematuria, menstrual problem, pelvic pain, vaginal bleeding and vaginal pain  All other systems reviewed and are negative  Current Medications       Current Outpatient Medications:     amLODIPine (NORVASC) 5 mg tablet, Take 1 tablet (5 mg total) by mouth daily, Disp: 30 tablet, Rfl: 6    B-D ALLERGY SYRINGE 1CC/28G 28G X 1/2" 1 ML MISC, USE WEEKLY FOR THREE allergy injections AS DIRECTED, Disp: , Rfl: 2    Botulinum Toxin Type A (BOTOX) 200 units SOLR, Inject 155 units into face and neck IM every 90 days  , Disp: , Rfl:     CALCIUM-VITAMIN D PO, Take 1,500 mg by mouth daily, Disp: , Rfl:    Cholecalciferol (VITAMIN D) 2000 units CAPS, Take 2,000 Units by mouth daily  , Disp: , Rfl:     fluticasone (FLONASE) 50 mcg/act nasal spray, 2 sprays into each nostril daily, Disp: 16 g, Rfl: 0    levonorgestrel (MIRENA) 20 MCG/24HR IUD, by Intrauterine route, Disp: , Rfl:     Multiple Vitamin (MULTI-VITAMIN DAILY PO), Take by mouth daily , Disp: , Rfl:     SUMAtriptan Succinate (IMITREX STATDOSE REFILL) 6 MG/0 5ML SOCT, Inject 6 mg under the skin every 2 (two) hours as needed, Disp: , Rfl:     fluconazole (DIFLUCAN) 150 mg tablet, Take 1 tablet (150 mg total) by mouth once for 1 dose, Disp: 1 tablet, Rfl: 0    loratadine (CLARITIN) 10 mg tablet, Take 10 mg by mouth daily, Disp: , Rfl:     naproxen (NAPROSYN) 500 mg tablet, Take 1 tablet (500 mg total) by mouth 2 (two) times a day with meals for 10 days, Disp: 20 tablet, Rfl: 0    predniSONE 20 mg tablet, Take 3 pills a day for 3 days, then 2 pills a day for 3 days, then 1 pill a day for 3 days  (Patient not taking: Reported on 10/21/2021), Disp: , Rfl:     Current Allergies     Allergies as of 04/12/2022 - Reviewed 04/12/2022   Allergen Reaction Noted    Bee pollen  11/04/2014    Pollen extract  11/04/2014    Gentamicin Eye Swelling 09/18/2014            The following portions of the patient's history were reviewed and updated as appropriate: allergies, current medications, past family history, past medical history, past social history, past surgical history and problem list      Past Medical History:   Diagnosis Date    Allergic rhinitis     [facility] malformation ([facility] [address] [facility]     Migraine     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[facility] [address] [facility]     Psychiatric disorder        History reviewed  No pertinent surgical history  History reviewed  No pertinent family history  Medications have been verified          Objective   /80 (BP Location: Right arm, Patient Position: Sitting, Cuff Size: Adult)   Pulse 78   Temp 98 5 °F (36 9 °C) (Tympanic)   Resp 16   SpO2 97%   No LMP recorded  (Menstrual status: Birth Control)  Physical Exam     Physical Exam  Vitals reviewed  Constitutional:       Appearance: Normal appearance  She is well-developed  Cardiovascular:      Rate and Rhythm: Normal rate and regular rhythm  Pulses: Normal pulses  Pulmonary:      Effort: Pulmonary effort is normal    Genitourinary:     Comments: Deferred- no chaperone available  Skin:     General: Skin is warm and dry  Capillary Refill: Capillary refill takes less than 2 seconds  Neurological:      General: No focal deficit present  Mental Status: She is alert and oriented to person, place, and time     Psychiatric:         Mood and Affect: Mood normal          Behavior: Behavior normal

## 2022-04-12 NOTE — ASSESSMENT & PLAN NOTE
History consistent with vaginal candidiasis  Rx written for diflucan  Pt educated on reasons to follow up

## 2022-04-12 NOTE — PATIENT INSTRUCTIONS
Take diflucan as prescribed  Keep your skin dry and wear cotton clothing  Follow up with your PCP or return to the clinic if symptoms worsen or persist more than 3-5 days  Yeast Infection   WHAT YOU NEED TO KNOW:   A yeast infection, or vaginal candidiasis, is a common vaginal infection  A yeast infection is caused by a fungus, or yeast-like germ  Fungi are normally found in your vagina  Too many fungi can cause an infection  DISCHARGE INSTRUCTIONS:   Call your doctor or gynecologist if:   · You have a fever and chills  · You develop abdominal or pelvic pain  · Your discharge is bloody and it is not your monthly period  · Your signs and symptoms get worse, even after treatment  · You have questions or concerns about your condition or care  Medicines:   · Medicines  help treat the fungal infection and decrease inflammation  The medicine may be a pill, cream, ointment, or vaginal tablet or suppository  · Take your medicine as directed  Contact your healthcare provider if you think your medicine is not helping or if you have side effects  Tell him of her if you are allergic to any medicine  Keep a list of the medicines, vitamins, and herbs you take  Include the amounts, and when and why you take them  Bring the list or the pill bottles to follow-up visits  Carry your medicine list with you in case of an emergency  Keep your vagina healthy:   · Clean your genital area with mild soap and warm water each day  Do not get soap inside your vagina  Gently dry the area after washing  Do not use hot tubs  The heat and moisture from hot tubs can increase your risk for another yeast infection  · Always wipe from front to back  after you use the toilet  This prevents spreading bacteria from your rectal area into your vagina  · Do not wear tight-fitting clothes or undergarments  for long periods of time  Wear cotton underwear during the day   Cotton helps keep your genital area dry and does not hold in warmth or moisture  Do not wear underwear at night  · Do not douche  or use feminine hygiene sprays or bubble bath  Do not use pads or tampons that are scented, or colored or perfumed toilet paper  · Do not have sex until your symptoms go away  Have your partner wear a condom until you complete your course of medication  · Ask your healthcare provider about birth control options if necessary  Condoms have latex and diaphragms have gel that kills sperm  Both of these may irritate your genital area  Follow up with your doctor or gynecologist as directed:  Write down your questions so you remember to ask them during your visits  © [employer] 2022 Information is for End User's use only and may not be sold, redistributed or otherwise used for commercial purposes  All illustrations and images included in CareNotes® are the copyrighted property of [employer]  or [employer]   The above information is an  only  It is not intended as medical advice for individual conditions or treatments  Talk to your doctor, nurse or pharmacist before following any medical regimen to see if it is safe and effective for you

## 2022-09-13 ENCOUNTER — OFFICE VISIT (OUTPATIENT)
Dept: RHEUMATOLOGY | Facility: CLINIC | Age: 42
End: 2022-09-13
Payer: COMMERCIAL

## 2022-09-13 VITALS — BODY MASS INDEX: 22.08 KG/M2 | HEIGHT: 72 IN | WEIGHT: 163 LBS

## 2022-09-13 DIAGNOSIS — H04.123 DRY EYES: ICD-10-CM

## 2022-09-13 DIAGNOSIS — I73.00 RAYNAUD'S PHENOMENON WITHOUT GANGRENE: Primary | ICD-10-CM

## 2022-09-13 DIAGNOSIS — I87.2 VENOUS INSUFFICIENCY: ICD-10-CM

## 2022-09-13 PROCEDURE — 99214 OFFICE O/P EST MOD 30 MIN: CPT | Performed by: INTERNAL MEDICINE

## 2022-09-13 RX ORDER — AMLODIPINE BESYLATE 5 MG/1
5 TABLET ORAL DAILY
Qty: 30 TABLET | Refills: 6 | Status: SHIPPED | OUTPATIENT
Start: 2022-09-13

## 2022-09-13 NOTE — PROGRESS NOTES
Assessment and Plan:   Ari Toro is a 38 y o   female who presents for follow-up of her primary Raynaud's syndrome  Last clinic visit 3/14/2022 which was her initial visit  Underlying connective tissue disease was ruled out  Patient never started amlodipine  Reach out to our office with any new or worsening symptoms  Start amlodipine 5mg po daily during winter months; PCP can continue to refill if helpful    Return to clinic as needed    Plan:  Diagnoses and all orders for this visit:    Raynaud's phenomenon without gangrene  -     amLODIPine (NORVASC) 5 mg tablet; Take 1 tablet (5 mg total) by mouth daily    Venous insufficiency    Dry eyes  Follow-up plan: RTC as needed        Rheumatic Disease Summary  3/14/2022: Ari Toro is a 38 y o   female who presented as a Rheumatology consult referred by Geno Johnson for evaluation of Raynaud syndrome  Besides the Raynaud symptoms, patient had no signs or symptoms consistent with an underlying connective tissue disease, but ordered CECILIA to officially rule-out  However, she did report history of her 35-year-old daughter passing away from bilateral pulmonary embolism, with history of left LE DVT seven years prior to that  With patient currently having left deep venous insufficiency, ordered antiphospholipid panel to rule out antiphospholipid syndrome  Patient was currently not on any anticoagulants  Her physical exam did not show any periungual erythema or livedoid rash  Since she does have some uncomfortableness with her Raynaud symptoms, started her on amlodipine 5 mg p o  daily  Right now her Raynaud symptoms seem consistent with primary Raynaud's syndrome rather than secondary to an underlying connective tissue disease  Of note, patient has history of fragility fractures of hip at a young age, completed 2 years of [employer]  Do autoimmune disease and antiphospholipid syndrome workup labs   Start amlodipine 5mg daily       HPI  Karla Metz is a 38 y o   female who presents for follow-up of her Raynaud's syndrome  Last clinic visit was 3/14/2022 which was her initial visit  Admits that her left leg knee down gets tight and burns/swells  The following portions of the patient's history were reviewed and updated as appropriate: allergies, current medications, past family history, past medical history, past social history, past surgical history and problem list     Review of Systems:   Review of Systems   Constitutional: Negative for fatigue  HENT: Positive for sinus pressure and sinus pain  Negative for mouth sores  Eyes: Positive for pain  Dry eyes   Respiratory: Negative for shortness of breath  Cardiovascular: Positive for leg swelling  Gastrointestinal: Positive for constipation  Musculoskeletal: Positive for myalgias and neck pain  Negative for arthralgias and joint swelling  Skin: Positive for color change  Negative for rash  Neurological: Positive for numbness and headaches  Negative for weakness  Hematological: Negative for adenopathy  Psychiatric/Behavioral: Negative for sleep disturbance  Reviewed and agree  Home Medications:    Current Outpatient Medications:   •  amLODIPine (NORVASC) 5 mg tablet, Take 1 tablet (5 mg total) by mouth daily, Disp: 30 tablet, Rfl: 6  •  B-D ALLERGY SYRINGE 1CC/28G 28G X 1/2" 1 ML MISC, USE WEEKLY FOR THREE allergy injections AS DIRECTED, Disp: , Rfl: 2  •  Botulinum Toxin Type A 200 units SOLR, Inject 155 units into face and neck IM every 90 days  , Disp: , Rfl:   •  CALCIUM-VITAMIN D PO, Take 1,500 mg by mouth daily, Disp: , Rfl:   •  Cholecalciferol (VITAMIN D) 2000 units CAPS, Take 2,000 Units by mouth daily  , Disp: , Rfl:   •  levonorgestrel (MIRENA) 20 MCG/24HR IUD, by Intrauterine route, Disp: , Rfl:   •  Multiple Vitamin (MULTI-VITAMIN DAILY PO), Take by mouth daily , Disp: , Rfl:   •  SUMAtriptan Succinate 6 MG/0 5ML SOCT, Inject 6 mg under the skin every 2 (two) hours as needed, Disp: , Rfl:   •  fluticasone (FLONASE) 50 mcg/act nasal spray, 2 sprays into each nostril daily, Disp: 16 g, Rfl: 0  •  loratadine (CLARITIN) 10 mg tablet, Take 10 mg by mouth daily, Disp: , Rfl:   •  naproxen (NAPROSYN) 500 mg tablet, Take 1 tablet (500 mg total) by mouth 2 (two) times a day with meals for 10 days, Disp: 20 tablet, Rfl: 0  •  predniSONE 20 mg tablet, Take 3 pills a day for 3 days, then 2 pills a day for 3 days, then 1 pill a day for 3 days  (Patient not taking: Reported on 10/21/2021), Disp: , Rfl:     Objective:    Vitals:    09/13/22 1534   Weight: 73 9 kg (163 lb)   Height: 6' (1 829 m)       Physical Exam  Constitutional:       General: She is not in acute distress  HENT:      Head: Normocephalic and atraumatic  Eyes:      Conjunctiva/sclera: Conjunctivae normal    Cardiovascular:      Rate and Rhythm: Normal rate and regular rhythm  Heart sounds: S1 normal and S2 normal      No friction rub  Pulmonary:      Effort: Pulmonary effort is normal  No respiratory distress  Breath sounds: Normal breath sounds  No wheezing, rhonchi or rales  Musculoskeletal:      Cervical back: Neck supple  Skin:     Coloration: Skin is not pale  Neurological:      Mental Status: She is alert  Mental status is at baseline  Psychiatric:         Mood and Affect: Mood normal          Behavior: Behavior normal        Reviewed labs and imaging  Imaging:   VAS reflux lower limb duplex 11/9/21  LEFT LIMB:  Deep venous incompetence is noted in the proximal femoral vein and the deep  femoral vein  The great saphenous vein is competent  The great saphenous vein remains within the saphenous compartment in the thigh  The small saphenous vein is competent and does not communicate with the  popliteal vein  There is no evidence of incompetent perforators in the thigh or calf    There is no evidence of deep vein thrombosis in the CFV, the proximal PFV, the  femoral vein and the popliteal vein      Labs:   Office Visit on 03/29/2022   Component Date Value Ref Range Status   • WBC 03/29/2022 6 40  4 31 - 10 16 Thousand/uL Final   • RBC 03/29/2022 4 68  3 81 - 5 12 Million/uL Final   • Hemoglobin 03/29/2022 15 5 (A) 11 5 - 15 4 g/dL Final   • Hematocrit 03/29/2022 47 7 (A) 34 8 - 46 1 % Final   • MCV 03/29/2022 102 (A) 82 - 98 fL Final   • MCH 03/29/2022 33 1  26 8 - 34 3 pg Final   • MCHC 03/29/2022 32 5  31 4 - 37 4 g/dL Final   • RDW 03/29/2022 12 2  11 6 - 15 1 % Final   • MPV 03/29/2022 11 4  8 9 - 12 7 fL Final   • Platelets 60/28/8340 241  149 - 390 Thousands/uL Final   • nRBC 03/29/2022 0  /100 WBCs Final   • Neutrophils Relative 03/29/2022 73  43 - 75 % Final   • Immat GRANS % 03/29/2022 0  0 - 2 % Final   • Lymphocytes Relative 03/29/2022 18  14 - 44 % Final   • Monocytes Relative 03/29/2022 7  4 - 12 % Final   • Eosinophils Relative 03/29/2022 1  0 - 6 % Final   • Basophils Relative 03/29/2022 1  0 - 1 % Final   • Neutrophils Absolute 03/29/2022 4 68  1 85 - 7 62 Thousands/µL Final   • Immature Grans Absolute 03/29/2022 0 02  0 00 - 0 20 Thousand/uL Final   • Lymphocytes Absolute 03/29/2022 1 13  0 60 - 4 47 Thousands/µL Final   • Monocytes Absolute 03/29/2022 0 46  0 17 - 1 22 Thousand/µL Final   • Eosinophils Absolute 03/29/2022 0 04  0 00 - 0 61 Thousand/µL Final   • Basophils Absolute 03/29/2022 0 07  0 00 - 0 10 Thousands/µL Final   • Sodium 03/29/2022 135 (A) 136 - 145 mmol/L Final   • Potassium 03/29/2022 4 3  3 5 - 5 3 mmol/L Final   • Chloride 03/29/2022 105  100 - 108 mmol/L Final   • CO2 03/29/2022 26  21 - 32 mmol/L Final   • ANION GAP 03/29/2022 4  4 - 13 mmol/L Final   • BUN 03/29/2022 13  5 - 25 mg/dL Final   • Creatinine 03/29/2022 0 96  0 60 - 1 30 mg/dL Final    Standardized to IDMS reference method   • Glucose, Fasting 03/29/2022 86  65 - 99 mg/dL Final    Specimen collection should occur prior to Sulfasalazine administration due to the potential for falsely depressed results  Specimen collection should occur prior to Sulfapyridine administration due to the potential for falsely elevated results  • Calcium 03/29/2022 10 0  8 3 - 10 1 mg/dL Final   • AST 03/29/2022 24  5 - 45 U/L Final    Specimen collection should occur prior to Sulfasalazine administration due to the potential for falsely depressed results  • ALT 03/29/2022 25  12 - 78 U/L Final    Specimen collection should occur prior to Sulfasalazine and/or Sulfapyridine administration due to the potential for falsely depressed results  • Alkaline Phosphatase 03/29/2022 20 (A) 46 - 116 U/L Final   • Total Protein 03/29/2022 8 8 (A) 6 4 - 8 2 g/dL Final   • Albumin 03/29/2022 5 0  3 5 - 5 0 g/dL Final   • Total Bilirubin 03/29/2022 0 70  0 20 - 1 00 mg/dL Final    Use of this assay is not recommended for patients undergoing treatment with eltrombopag due to the potential for falsely elevated results  • eGFR 03/29/2022 74  ml/min/1 73sq m Final   • CRP 03/29/2022 <3 0  <3 0 mg/L Final   • Sed Rate 03/29/2022 6  0 - 19 mm/hour Final   • CECILIA 03/29/2022 Negative  Negative Final   • ANTICARDIOLIPIN IGG ANTIBODY 03/29/2022 1 2  See comment Final   • ANTICARDIOLIPIN IGA ANTIBODY 03/29/2022 1 9  See comment Final   • ANTICARDIOLIPIN IGM ANTIBODY 03/29/2022 2 1  See comment Final   • PTT Lupus Anticoagulant 03/29/2022 31 7  0 0 - 51 9 sec Final   • Dilute Viper Venom Time 03/29/2022 31 1  0 0 - 47 0 sec Final   • DILUTE PROTHROMBIN TIME(DPT) 03/29/2022 38 2  0 0 - 47 6 sec Final   • THROMBIN TIME (DRVW) 03/29/2022 19 4  0 0 - 23 0 sec Final   • DPT CONFIRM RATIO 03/29/2022 1 00  0 00 - 1 34 Ratio Final   • LUPUS REFLEX INTERPRETATION 03/29/2022 Comment:   Final    No lupus anticoagulant was detected     • BETA 2 GLYCO 1 IGG 03/29/2022 0 9  See comment Final   • BETA 2 GLYCO 1 IGA 03/29/2022 1 5  See comment Final   • BETA 2 GLYCO 1 IGM 03/29/2022 3 7  See comment Final   • SS-A (RO) Ab 03/29/2022 <0 2  0 0 - 0 9 AI Final   • SS-B (LA) Ab 03/29/2022 <0 2  0 0 - 0 9 AI Final   • Vit D, 25-Hydroxy 03/29/2022 23 7 (A) 30 0 - 100 0 ng/mL Final

## 2022-09-13 NOTE — PATIENT INSTRUCTIONS
Reach out to our office with any new or worsening symptoms  Start amlodipine 5mg daily during winter months; PCP can continue to refill if helpful    Return to clinic as needed

## 2022-12-12 ENCOUNTER — OFFICE VISIT (OUTPATIENT)
Dept: URGENT CARE | Facility: CLINIC | Age: 42
End: 2022-12-12

## 2022-12-12 VITALS
HEART RATE: 80 BPM | RESPIRATION RATE: 16 BRPM | TEMPERATURE: 98.3 F | SYSTOLIC BLOOD PRESSURE: 118 MMHG | DIASTOLIC BLOOD PRESSURE: 78 MMHG | OXYGEN SATURATION: 96 %

## 2022-12-12 DIAGNOSIS — M71.22 BAKER'S CYST OF KNEE, LEFT: Primary | ICD-10-CM

## 2022-12-12 DIAGNOSIS — Z23 NEEDS FLU SHOT: ICD-10-CM

## 2022-12-12 NOTE — PATIENT INSTRUCTIONS
Recommend ace wrap or compression sleeve to left knee during the day  Avoid strenuous activity  Use ice several times daily  Elevate your leg when at rest     Follow up with your PCP or return to the clinic if symptoms worsen or persist more than 5-7 days

## 2022-12-12 NOTE — ASSESSMENT & PLAN NOTE
History and exam findings consistent with baker's cyst   Conservative treatment recommended  Pt declined ace wrap in office, she states she has this at home  Pt advised to not wear ace wrap or compression at night  Reasons to follow up discussed  All questions answered

## 2022-12-12 NOTE — PROGRESS NOTES
[facility]        NAME: Mejia Gómez is a 38 y o  female  : 1980    MRN: [id number]  DATE: 2022  TIME: 9:53 AM    Assessment and Plan   Baker's cyst of knee, left [M71 22]  1  Baker's cyst of knee, left        2  Needs flu shot  FLUARIX: influenza vaccine, quadrivalent, 0 5 mL            Patient Instructions       Recommend ace wrap or compression sleeve to left knee during the day  Avoid strenuous activity  Use ice several times daily  Elevate your leg when at rest     Follow up with your PCP or return to the clinic if symptoms worsen or persist more than 5-7 days  Proceed to  ER if symptoms worsen  Chief Complaint     Chief Complaint   Patient presents with   • Knee Pain     Left          History of Present Illness       Lump and pain behind left knee x 2 days  Standing/ walking/ bending her knee worsens the pain  Has not noticed anything that improves the pain  She tried tylenol, motrin, heat, ice- did not help  She thinks she was squatting and standing when this occurred  She was painting at the time  She also requests a flu shot  Denies allergy to flu shot in the past         Review of Systems   Review of Systems   Constitutional: Negative  Negative for chills and fever  Respiratory: Negative  Cardiovascular: Negative  Musculoskeletal: Positive for joint swelling (swelling behind left knee ) and myalgias  Negative for arthralgias, back pain, gait problem, neck pain and neck stiffness  Skin: Negative for rash  Neurological: Negative  All other systems reviewed and are negative          Current Medications       Current Outpatient Medications:   •  amLODIPine (NORVASC) 5 mg tablet, Take 1 tablet (5 mg total) by mouth daily, Disp: 30 tablet, Rfl: 6  •  B-D ALLERGY SYRINGE 1CC/28G 28G X 1/2" 1 ML MISC, USE WEEKLY FOR THREE allergy injections AS DIRECTED, Disp: , Rfl: 2  •  Botulinum Toxin Type A 200 units SOLR, Inject 155 units into face and neck IM every 90 days  , Disp: , Rfl:   •  CALCIUM-VITAMIN D PO, Take 1,500 mg by mouth daily, Disp: , Rfl:   •  Cholecalciferol (VITAMIN D) 2000 units CAPS, Take 2,000 Units by mouth daily  , Disp: , Rfl:   •  levonorgestrel (MIRENA) 20 MCG/24HR IUD, by Intrauterine route, Disp: , Rfl:   •  Multiple Vitamin (MULTI-VITAMIN DAILY PO), Take by mouth daily , Disp: , Rfl:   •  SUMAtriptan Succinate 6 MG/0 5ML SOCT, Inject 6 mg under the skin every 2 (two) hours as needed, Disp: , Rfl:   •  fluticasone (FLONASE) 50 mcg/act nasal spray, 2 sprays into each nostril daily, Disp: 16 g, Rfl: 0  •  loratadine (CLARITIN) 10 mg tablet, Take 10 mg by mouth daily, Disp: , Rfl:   •  naproxen (NAPROSYN) 500 mg tablet, Take 1 tablet (500 mg total) by mouth 2 (two) times a day with meals for 10 days, Disp: 20 tablet, Rfl: 0  •  predniSONE 20 mg tablet, Take 3 pills a day for 3 days, then 2 pills a day for 3 days, then 1 pill a day for 3 days  (Patient not taking: Reported on 10/21/2021), Disp: , Rfl:     Current Allergies     Allergies as of 12/12/2022 - Reviewed 12/12/2022   Allergen Reaction Noted   • Bee pollen  11/04/2014   • Pollen extract  11/04/2014   • Gentamicin Eye Swelling 09/18/2014            The following portions of the patient's history were reviewed and updated as appropriate: allergies, current medications, past family history, past medical history, past social history, past surgical history and problem list      Past Medical History:   Diagnosis Date   • Allergic rhinitis    • Chiari I malformation ([facility] [address] )    • Migraine    • 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[address] )    • Psychiatric disorder        History reviewed  No pertinent surgical history  History reviewed  No pertinent family history  Medications have been verified  Objective   /78 (BP Location: Right arm, Patient Position: Sitting, Cuff Size: Adult)   Pulse 80   Temp 98 3 °F (36 8 °C) (Tympanic)   Resp 16   SpO2 96%   No LMP recorded         Physical Exam Physical Exam  Constitutional:       Appearance: Normal appearance  She is well-developed  HENT:      Head: Normocephalic and atraumatic  Cardiovascular:      Rate and Rhythm: Normal rate  Pulses: Normal pulses  Pulmonary:      Effort: Pulmonary effort is normal    Musculoskeletal:      Cervical back: Normal range of motion and neck supple  Right knee: Normal       Left knee: Swelling (posterior) and effusion present  No erythema, ecchymosis, lacerations, bony tenderness or crepitus  Normal range of motion  Tenderness (posterior) present  No LCL laxity, MCL laxity, ACL laxity or PCL laxity  Normal alignment, normal meniscus and normal patellar mobility  Normal pulse  Instability Tests: Anterior drawer test negative  Posterior drawer test negative  Anterior Lachman test negative  Medial Andrea test negative and lateral Andrea test negative  Legs:    Skin:     General: Skin is warm and dry  Capillary Refill: Capillary refill takes less than 2 seconds  Neurological:      General: No focal deficit present  Mental Status: She is alert and oriented to person, place, and time     Psychiatric:         Mood and Affect: Mood normal          Behavior: Behavior normal

## 2023-01-31 ENCOUNTER — OFFICE VISIT (OUTPATIENT)
Dept: URGENT CARE | Facility: CLINIC | Age: 43
End: 2023-01-31

## 2023-01-31 VITALS
TEMPERATURE: 97.6 F | OXYGEN SATURATION: 98 % | RESPIRATION RATE: 16 BRPM | HEART RATE: 72 BPM | DIASTOLIC BLOOD PRESSURE: 80 MMHG | SYSTOLIC BLOOD PRESSURE: 122 MMHG

## 2023-01-31 DIAGNOSIS — J01.00 ACUTE NON-RECURRENT MAXILLARY SINUSITIS: Primary | ICD-10-CM

## 2023-01-31 DIAGNOSIS — M71.22 BAKER'S CYST OF KNEE, LEFT: ICD-10-CM

## 2023-01-31 RX ORDER — FLUTICASONE PROPIONATE 50 MCG
2 SPRAY, SUSPENSION (ML) NASAL DAILY
Qty: 16 G | Refills: 0 | Status: SHIPPED | OUTPATIENT
Start: 2023-01-31 | End: 2023-03-02

## 2023-01-31 RX ORDER — AMOXICILLIN AND CLAVULANATE POTASSIUM 875; 125 MG/1; MG/1
1 TABLET, FILM COATED ORAL EVERY 12 HOURS SCHEDULED
Qty: 14 TABLET | Refills: 0 | Status: SHIPPED | OUTPATIENT
Start: 2023-01-31 | End: 2023-02-07

## 2023-01-31 NOTE — PATIENT INSTRUCTIONS
Take Augmentin as prescribed, take with food  Continue Flonase nasal spray daily  Increase your water intake and use nasal saline rinses  Recommend Mucinex as needed  Follow up with your PCP or return to the clinic if symptoms worsen or persist more than 3-5 days  Follow up with Orthopedics for your knee pain

## 2023-01-31 NOTE — PROGRESS NOTES
[facility]        NAME: Fredrick Trevizo is a 41 y o  female  : 1980    MRN: [id number]  DATE: 2023  TIME: 11:40 AM    Assessment and Plan   Acute non-recurrent maxillary sinusitis [J01 00]  1  Acute non-recurrent maxillary sinusitis  amoxicillin-clavulanate (AUGMENTIN) 875-125 mg per tablet    fluticasone (FLONASE) 50 mcg/act nasal spray      2  Baker's cyst of knee, left  Ambulatory Referral to Orthopedic Surgery            Patient Instructions       Take Augmentin as prescribed, take with food  Continue Flonase nasal spray daily  Increase your water intake and use nasal saline rinses  Recommend Mucinex as needed  Follow up with your PCP or return to the clinic if symptoms worsen or persist more than 3-5 days  Follow up with Orthopedics for your knee pain  Proceed to  ER if symptoms worsen  Chief Complaint     Chief Complaint   Patient presents with   • Sinusitis   • Joint Swelling     Left posterior          History of Present Illness       Sinus pressure/ congestion x 2 weeks  Sore throat, pressure in ears & behind eyes  She has been taking tylenol sinus, nasal spray- does not help  No fever or chills  No known sick contacts  Follow up for swelling behind L knee  She was seen 7 weeks ago for this and dx with Bakers cyst   Pain and swelling persists  Swelling has not increased  She had been using an ace wrap and tylenol- did not help  Pain worsened with walking, improved with rest         Review of Systems   Review of Systems   Constitutional: Negative  Negative for chills, fatigue and fever  HENT: Positive for congestion, ear pain (ear pressure), postnasal drip, rhinorrhea, sinus pressure, sinus pain and sore throat  Negative for ear discharge, hearing loss and trouble swallowing  Eyes: Negative  Respiratory: Negative for cough, shortness of breath and wheezing  Cardiovascular: Negative  Negative for chest pain and palpitations  Musculoskeletal: Positive for arthralgias (posterior left knee) and joint swelling (posterior L knee)  Negative for gait problem and myalgias  Skin: Negative  Negative for rash  Neurological: Positive for headaches  All other systems reviewed and are negative  Current Medications       Current Outpatient Medications:   •  amLODIPine (NORVASC) 5 mg tablet, Take 1 tablet (5 mg total) by mouth daily, Disp: 30 tablet, Rfl: 6  •  amoxicillin-clavulanate (AUGMENTIN) 875-125 mg per tablet, Take 1 tablet by mouth every 12 (twelve) hours for 7 days, Disp: 14 tablet, Rfl: 0  •  B-D ALLERGY SYRINGE 1CC/28G 28G X 1/2" 1 ML MISC, USE WEEKLY FOR THREE allergy injections AS DIRECTED, Disp: , Rfl: 2  •  Botulinum Toxin Type A 200 units SOLR, Inject 155 units into face and neck IM every 90 days  , Disp: , Rfl:   •  CALCIUM-VITAMIN D PO, Take 1,500 mg by mouth daily, Disp: , Rfl:   •  Cholecalciferol (VITAMIN D) 2000 units CAPS, Take 2,000 Units by mouth daily  , Disp: , Rfl:   •  fluticasone (FLONASE) 50 mcg/act nasal spray, 2 sprays into each nostril daily, Disp: 16 g, Rfl: 0  •  levonorgestrel (MIRENA) 20 MCG/24HR IUD, by Intrauterine route, Disp: , Rfl:   •  Multiple Vitamin (MULTI-VITAMIN DAILY PO), Take by mouth daily , Disp: , Rfl:   •  SUMAtriptan Succinate 6 MG/0 5ML SOCT, Inject 6 mg under the skin every 2 (two) hours as needed, Disp: , Rfl:   •  loratadine (CLARITIN) 10 mg tablet, Take 10 mg by mouth daily, Disp: , Rfl:   •  naproxen (NAPROSYN) 500 mg tablet, Take 1 tablet (500 mg total) by mouth 2 (two) times a day with meals for 10 days, Disp: 20 tablet, Rfl: 0  •  predniSONE 20 mg tablet, Take 3 pills a day for 3 days, then 2 pills a day for 3 days, then 1 pill a day for 3 days   (Patient not taking: Reported on 10/21/2021), Disp: , Rfl:     Current Allergies     Allergies as of 01/31/2023 - Reviewed 01/31/2023   Allergen Reaction Noted   • Bee pollen  11/04/2014   • Pollen extract  11/04/2014   • Gentamicin Eye Swelling 09/18/2014            The following portions of the patient's history were reviewed and updated as appropriate: allergies, current medications, past family history, past medical history, past social history, past surgical history and problem list      Past Medical History:   Diagnosis Date   • Allergic rhinitis    • Chiari I malformation ([address] )    • Migraine    • Pituitary cyst [facility])    • Psychiatric disorder        History reviewed  No pertinent surgical history  History reviewed  No pertinent family history  Medications have been verified  Objective   /80 (BP Location: Right arm, Patient Position: Sitting, Cuff Size: Adult)   Pulse 72   Temp 97 6 °F (36 4 °C) (Tympanic)   Resp 16   SpO2 98%   No LMP recorded  (Menstrual status: Birth Control)  Physical Exam     Physical Exam  Vitals and nursing note reviewed  Constitutional:       General: She is not in acute distress  Appearance: Normal appearance  She is well-developed  HENT:      Head: Normocephalic and atraumatic  Jaw: There is normal jaw occlusion  Right Ear: Hearing, ear canal and external ear normal  A middle ear effusion is present  Left Ear: Hearing, ear canal and external ear normal  A middle ear effusion is present  Nose: Mucosal edema, congestion and rhinorrhea present  Rhinorrhea is purulent  Right Turbinates: Swollen  Left Turbinates: Swollen  Right Sinus: Maxillary sinus tenderness present  Left Sinus: Maxillary sinus tenderness present  Mouth/Throat:      Lips: Pink  Mouth: Mucous membranes are moist       Dentition: Normal dentition  Pharynx: Uvula midline  Posterior oropharyngeal erythema present  No oropharyngeal exudate  Tonsils: No tonsillar exudate  1+ on the right  1+ on the left  Eyes:      Conjunctiva/sclera: Conjunctivae normal       Pupils: Pupils are equal, round, and reactive to light     Cardiovascular:      Rate and Rhythm: Normal rate and regular rhythm  Pulses: Normal pulses  Heart sounds: Normal heart sounds  Pulmonary:      Effort: Pulmonary effort is normal       Breath sounds: Normal breath sounds and air entry  No stridor, decreased air movement or transmitted upper airway sounds  Musculoskeletal:         General: Normal range of motion  Cervical back: Normal range of motion and neck supple  Right knee: Normal       Left knee: Swelling and effusion present  No erythema, bony tenderness or crepitus  Normal range of motion  Tenderness present  Normal alignment, normal meniscus and normal patellar mobility  Normal pulse  Left lower leg: No edema  Legs:    Skin:     General: Skin is warm and dry  Capillary Refill: Capillary refill takes less than 2 seconds  Neurological:      General: No focal deficit present  Mental Status: She is alert and oriented to person, place, and time     Psychiatric:         Mood and Affect: Mood normal          Behavior: Behavior normal

## 2023-01-31 NOTE — ASSESSMENT & PLAN NOTE
History and exam findings consistent with sinusitis  Rx written for Augmentin and refill provided for flonase nasal spray  symptomatic care also recommended  Reasons to follow up reviewed with pt  All questions answered

## 2023-01-31 NOTE — ASSESSMENT & PLAN NOTE
Symptoms persist despite conservative treatment  Referral provided for ortho  Pt advised to continue conservative treatment and follow up with ortho for further evaluation and treatment

## 2023-02-01 ENCOUNTER — APPOINTMENT (OUTPATIENT)
Dept: RADIOLOGY | Facility: CLINIC | Age: 43
End: 2023-02-01

## 2023-02-01 ENCOUNTER — OFFICE VISIT (OUTPATIENT)
Dept: OBGYN CLINIC | Facility: CLINIC | Age: 43
End: 2023-02-01

## 2023-02-01 VITALS
SYSTOLIC BLOOD PRESSURE: 122 MMHG | HEIGHT: 72 IN | BODY MASS INDEX: 21.26 KG/M2 | DIASTOLIC BLOOD PRESSURE: 82 MMHG | WEIGHT: 157 LBS

## 2023-02-01 DIAGNOSIS — M71.22 BAKER'S CYST OF KNEE, LEFT: ICD-10-CM

## 2023-02-01 DIAGNOSIS — M23.92 INTERNAL DERANGEMENT OF LEFT KNEE: ICD-10-CM

## 2023-02-01 DIAGNOSIS — M25.562 LEFT KNEE PAIN, UNSPECIFIED CHRONICITY: ICD-10-CM

## 2023-02-01 DIAGNOSIS — M25.562 ACUTE PAIN OF LEFT KNEE: Primary | ICD-10-CM

## 2023-02-01 NOTE — PROGRESS NOTES
Assessment:     1  Acute pain of left knee    2  Internal derangement of left knee    3  Baker's cyst of knee, left        Plan:     Problem List Items Addressed This Visit        Musculoskeletal and Integument    Baker's cyst of knee, left       Other    Acute pain of left knee - Primary    Relevant Orders    MRI knee left  wo contrast   Other Visit Diagnoses     Internal derangement of left knee        Relevant Orders    XR knee 4+ vw left injury    MRI knee left  wo contrast        Findings today are consistent with acute left knee pain with bakers cyst present, possible meniscus injury  Imaging and prognosis was reviewed with the patient today  With patient's mechanism of injury, I am concerned the patient may have injured her meniscus  MRI of the left knee will be obtained to further evaluate the soft tissue structures of the knee  Patient will follow up after imaging  I advised patient to avoid activities that would aggravate her knee further  Use over-the-counter NSAID as needed  I will see patient back after her left knee MRI  All patient's questions were answered to her satisfaction  This note is created using dictation transcription  It may contain typographical errors, grammatical errors, improperly dictated words, background noise and other errors  Subjective:     Patient ID: Tracie Tang is a 41 y o  female  Chief Complaint:  The patient presents with a chief complaint of left knee pain  Patient is referred here by Mrs Gerry Garcia  The pain began 3 month(s) ago and is associated with an acute injury  Patient was painting and she was squatting down and when she stood up felt a pop- pain presented posterior knee  The patient describes the pain as aching, dull, sharp and throbbing  It is constant in timing, and localizes the pain to the posterior knee   The pain is worse with walking, standing, ascending stairs, descending stairs, sitting, squatting, twisting and kneeling and relieved with rest, medication: Tylenol used and beneficial, avoiding painful activities  She denies mechanical symptoms such as locking and catching  She denies instability of the knee  Patient did have a problem related to her varicose vein in the back of her left knee back in 2019 that was treated by another orthopedic doctor and vascular surgeon  Patient does state that her left posterior knee pain never completely resolved, but the pain she is experiencing now has a after the popping incident  Information on patient's intake form was reviewed  Allergy:  Allergies   Allergen Reactions   • Bee Pollen    • Pollen Extract    • Gentamicin Eye Swelling     Eye ointment only, tolerates flu shot with no problems  Medications:  all current active meds have been reviewed  Past Medical History:  Past Medical History:   Diagnosis Date   • Allergic rhinitis    • [facility]    • Migraine    • Pituitary cyst [facility])    • Psychiatric disorder      Past Surgical History:  History reviewed  No pertinent surgical history  Family History:  History reviewed  No pertinent family history  Social History:  Social History     Substance and Sexual Activity   Alcohol Use Yes   • Alcohol/week: 6 0 standard drinks   • Types: 6 Standard drinks or equivalent per week    Comment: frequently     Social History     Substance and Sexual Activity   Drug Use No     Social History     Tobacco Use   Smoking Status Never   Smokeless Tobacco Never     Review of Systems   Constitutional: Negative for chills and fever  HENT: Negative for ear pain and sore throat  Eyes: Negative for pain and visual disturbance  Respiratory: Negative for cough and shortness of breath  Cardiovascular: Negative for chest pain and palpitations  Gastrointestinal: Negative for abdominal pain and vomiting  Genitourinary: Negative for dysuria and hematuria     Musculoskeletal: Positive for arthralgias (Left knee), gait problem (Antalgic) and joint swelling (Left knee posteriorly)  Negative for back pain  Skin: Negative for color change and rash  Neurological: Negative for seizures and syncope  Psychiatric/Behavioral: Negative  All other systems reviewed and are negative  Objective:  BP Readings from Last 1 Encounters:   02/01/23 122/82      Wt Readings from Last 1 Encounters:   02/01/23 71 2 kg (157 lb)      BMI:   Estimated body mass index is 21 29 kg/m² as calculated from the following:    Height as of this encounter: 6' (1 829 m)  Weight as of this encounter: 71 2 kg (157 lb)  BSA:   Estimated body surface area is 1 92 meters squared as calculated from the following:    Height as of this encounter: 6' (1 829 m)  Weight as of this encounter: 71 2 kg (157 lb)  Physical Exam  Vitals and nursing note reviewed  Constitutional:       Appearance: Normal appearance  She is well-developed  HENT:      Head: Normocephalic and atraumatic  Right Ear: External ear normal       Left Ear: External ear normal    Eyes:      Extraocular Movements: Extraocular movements intact  Conjunctiva/sclera: Conjunctivae normal    Pulmonary:      Effort: Pulmonary effort is normal    Musculoskeletal:      Cervical back: Neck supple  Left knee: No effusion  Instability Tests: Medial Andrea test negative and lateral Andrea test negative  Skin:     General: Skin is warm and dry  Neurological:      Mental Status: She is alert and oriented to person, place, and time  Deep Tendon Reflexes: Reflexes are normal and symmetric  Psychiatric:         Mood and Affect: Mood normal          Behavior: Behavior normal        Left Knee Exam     Tenderness   Left knee tenderness location: posterior knee      Range of Motion   Extension: 0   Flexion: 130     Tests   Andrea:  Medial - negative Lateral - negative  Varus: negative Valgus: negative  Patellar apprehension: negative    Other   Erythema: absent  Scars: absent  Sensation: normal  Pulse: present  Swelling: none  Effusion: no effusion present    Comments:  Swelling in the back            I have personally reviewed pertinent films in PACS and my interpretation is XR of the left knee demonstrates no acute osseous abnormalities       Scribe Attestation    I,:  Bi Craig am acting as a scribe while in the presence of the attending physician :       I,:  Dom Durant MD personally performed the services described in this documentation    as scribed in my presence :

## 2023-02-10 ENCOUNTER — HOSPITAL ENCOUNTER (OUTPATIENT)
Dept: MRI IMAGING | Facility: HOSPITAL | Age: 43
Discharge: HOME | End: 2023-02-10
Attending: ORTHOPAEDIC SURGERY

## 2023-02-10 DIAGNOSIS — M23.92 INTERNAL DERANGEMENT OF LEFT KNEE: ICD-10-CM

## 2023-02-10 DIAGNOSIS — M25.562 ACUTE PAIN OF LEFT KNEE: ICD-10-CM

## 2023-02-16 ENCOUNTER — OFFICE VISIT (OUTPATIENT)
Dept: OBGYN CLINIC | Facility: CLINIC | Age: 43
End: 2023-02-16

## 2023-02-16 VITALS
BODY MASS INDEX: 21.26 KG/M2 | DIASTOLIC BLOOD PRESSURE: 78 MMHG | WEIGHT: 157 LBS | HEIGHT: 72 IN | SYSTOLIC BLOOD PRESSURE: 124 MMHG

## 2023-02-16 DIAGNOSIS — M22.42 CHONDROMALACIA OF LEFT PATELLOFEMORAL JOINT: Primary | ICD-10-CM

## 2023-02-16 DIAGNOSIS — M71.22 BAKER'S CYST OF KNEE, LEFT: ICD-10-CM

## 2023-02-16 NOTE — PROGRESS NOTES
Assessment:     1  Chondromalacia of left patellofemoral joint    2  Baker's cyst of knee, left        Plan:     Problem List Items Addressed This Visit        Musculoskeletal and Integument    Baker's cyst of knee, left    Chondromalacia of left patellofemoral joint - Primary    Relevant Orders    Brace    Ambulatory Referral to Physical Therapy    Findings consistent with left knee patellofemoral chondromalacia with Baker's cyst   Discussed findings and treatment options with the patient  I reviewed patient's left knee MRI with her  I discussed prognosis of her knee condition  I recommend patient to take over-the-counter NSAID  I will refer patient to physical therapy for knee rehabilitation and use a patella stabilizing brace  I advised patient to avoid high impact, repetitive squatting, kneeling, and climbing activities  I encourage patient to do stationary bike or swimming exercises  I will see patient back if her symptoms does not resolve with the above treatment in the few weeks  All patient's questions were answered to her satisfaction  This note is created using dictation transcription  It may contain typographical errors, grammatical errors, improperly dictated words, background noise and other errors  Subjective:     Patient ID: Eliz Ca is a 41 y o  female  Chief Complaint:  72-year-old female follow-up left knee pain  Patient continued to have pain in her left knee  She continued to have aching throbbing pain with her daily activities  She denies locking or giving way symptoms  Patient is here to review her left knee MRI  Allergy:  Allergies   Allergen Reactions   • Bee Pollen    • Pollen Extract    • Gentamicin Eye Swelling     Eye ointment only, tolerates flu shot with no problems       Medications:  all current active meds have been reviewed  Past Medical History:  Past Medical History:   Diagnosis Date   • Allergic rhinitis    [facility]    • Migraine    • Pituitary cyst [facility])    • Psychiatric disorder      Past Surgical History:  History reviewed  No pertinent surgical history  Family History:  History reviewed  No pertinent family history  Social History:  Social History     Substance and Sexual Activity   Alcohol Use Yes   • Alcohol/week: 6 0 standard drinks   • Types: 6 Standard drinks or equivalent per week    Comment: frequently     Social History     Substance and Sexual Activity   Drug Use No     Social History     Tobacco Use   Smoking Status Never   Smokeless Tobacco Never     Review of Systems   Constitutional: Negative  HENT: Negative  Eyes: Negative  Respiratory: Negative  Cardiovascular: Negative  Gastrointestinal: Negative  Endocrine: Negative  Genitourinary: Negative  Musculoskeletal: Positive for arthralgias (Left knee)  Skin: Negative  Allergic/Immunologic: Negative  Hematological: Negative  Psychiatric/Behavioral: Negative  Objective:  BP Readings from Last 1 Encounters:   02/16/23 124/78      Wt Readings from Last 1 Encounters:   02/16/23 71 2 kg (157 lb)      BMI:   Estimated body mass index is 21 29 kg/m² as calculated from the following:    Height as of this encounter: 6' (1 829 m)  Weight as of this encounter: 71 2 kg (157 lb)  BSA:   Estimated body surface area is 1 92 meters squared as calculated from the following:    Height as of this encounter: 6' (1 829 m)  Weight as of this encounter: 71 2 kg (157 lb)  Physical Exam  Vitals and nursing note reviewed  Constitutional:       Appearance: Normal appearance  She is well-developed  HENT:      Head: Normocephalic and atraumatic  Right Ear: External ear normal       Left Ear: External ear normal    Eyes:      Extraocular Movements: Extraocular movements intact  Conjunctiva/sclera: Conjunctivae normal    Pulmonary:      Effort: Pulmonary effort is normal    Musculoskeletal:      Cervical back: Neck supple        Left knee: No effusion  Instability Tests: Medial Andrea test negative and lateral Andrea test negative  Skin:     General: Skin is warm and dry  Neurological:      Mental Status: She is alert and oriented to person, place, and time  Deep Tendon Reflexes: Reflexes are normal and symmetric  Psychiatric:         Mood and Affect: Mood normal          Behavior: Behavior normal        Left Knee Exam     Muscle Strength   The patient has normal left knee strength  Tenderness   Left knee tenderness location: Posterior  Range of Motion   The patient has normal left knee ROM  Tests   Andrea:  Medial - negative Lateral - negative  Varus: negative Valgus: negative  Patellar apprehension: negative    Other   Erythema: absent  Sensation: normal  Pulse: present  Swelling: mild (Posterior)  Effusion: no effusion present            I have personally reviewed pertinent films in PACS and my interpretation is Knee MRI show intact ACL, PCL, MCL, LCL, and meniscus  There is fissuring in the cartilage over the patellofemoral compartment and focal area of bone edema in the trochlea  Baker's cyst posteriorly

## 2023-03-09 ENCOUNTER — OFFICE VISIT (OUTPATIENT)
Dept: URGENT CARE | Facility: CLINIC | Age: 43
End: 2023-03-09

## 2023-03-09 VITALS
OXYGEN SATURATION: 96 % | SYSTOLIC BLOOD PRESSURE: 112 MMHG | TEMPERATURE: 98 F | DIASTOLIC BLOOD PRESSURE: 70 MMHG | HEART RATE: 76 BPM | RESPIRATION RATE: 16 BRPM

## 2023-03-09 DIAGNOSIS — B35.3 TINEA PEDIS OF RIGHT FOOT: Primary | ICD-10-CM

## 2023-03-09 PROBLEM — B37.2 CANDIDAL INTERTRIGO: Status: ACTIVE | Noted: 2023-03-09

## 2023-03-09 RX ORDER — MICONAZOLE NITRATE
POWDER (GRAM) MISCELLANEOUS 2 TIMES DAILY
Qty: 500 G | Refills: 1 | Status: SHIPPED | OUTPATIENT
Start: 2023-03-09 | End: 2023-04-06

## 2023-03-09 RX ORDER — NYSTATIN 100000 U/G
OINTMENT TOPICAL 2 TIMES DAILY
Qty: 30 G | Refills: 0 | Status: SHIPPED | OUTPATIENT
Start: 2023-03-09 | End: 2023-03-09 | Stop reason: CLARIF

## 2023-03-09 NOTE — PATIENT INSTRUCTIONS
Apply Miconazole powder as directed  Keep skin cool and dry  Clean showers and bathrooms well  Wear sandals if in wet public areas  Follow up with your PCP or return to the clinic if symptoms worsen or persist more than 3-5 days

## 2023-03-09 NOTE — PROGRESS NOTES
[facility] [facility]        NAME: Zach Diaz is a 41 y o  female  : 1980    MRN: [id number]  DATE: 2023  TIME: 1:27 PM    Assessment and Plan   Tinea pedis of right foot [B35 3]  1  Tinea pedis of right foot  Wound culture and Gram stain    Miconazole Nitrate 2 % POWD    DISCONTINUED: nystatin (MYCOSTATIN) ointment            Patient Instructions       Apply Miconazole powder as directed  Keep skin cool and dry  Clean showers and bathrooms well  Wear sandals if in wet public areas  Follow up with your PCP or return to the clinic if symptoms worsen or persist more than 3-5 days  Proceed to  ER if symptoms worsen  Chief Complaint     Chief Complaint   Patient presents with   • Tinea Pedis         History of Present Illness       R foot skin irritation x 1 month  Redness, itching, irritation, between toes  Started as irritated skin between 4th & 5th toes, now spread to all toes  She tried applying athletes foot cream - had helped  Started on her 5th digit, has now spread to all toes  No fever or chills  No hx athlete's foot but her  gets it occasionally  Review of Systems   Review of Systems   Constitutional: Negative  Negative for chills, fatigue and fever  HENT: Negative  Respiratory: Negative  Negative for cough, shortness of breath and wheezing  Cardiovascular: Negative  Negative for chest pain and palpitations  Musculoskeletal: Negative  Skin: Positive for rash (between toes on right foot)  Negative for wound  Allergic/Immunologic: Negative  Neurological: Negative  All other systems reviewed and are negative          Current Medications       Current Outpatient Medications:   •  amLODIPine (NORVASC) 5 mg tablet, Take 1 tablet (5 mg total) by mouth daily, Disp: 30 tablet, Rfl: 6  •  B-D ALLERGY SYRINGE 1CC/28G 28G X 1/2" 1 ML MISC, USE WEEKLY FOR THREE allergy injections AS DIRECTED, Disp: , Rfl: 2  •  Botulinum Toxin Type A 200 units SOLR, Inject 155 units into face and neck IM every 90 days  , Disp: , Rfl:   •  CALCIUM-VITAMIN D PO, Take 1,500 mg by mouth daily, Disp: , Rfl:   •  Cholecalciferol (VITAMIN D) 2000 units CAPS, Take 2,000 Units by mouth daily  , Disp: , Rfl:   •  levonorgestrel (MIRENA) 20 MCG/24HR IUD, by Intrauterine route, Disp: , Rfl:   •  Miconazole Nitrate 2 % POWD, Use 2 (two) times a day for 28 days, Disp: 500 g, Rfl: 1  •  Multiple Vitamin (MULTI-VITAMIN DAILY PO), Take by mouth daily , Disp: , Rfl:   •  SUMAtriptan Succinate 6 MG/0 5ML SOCT, Inject 6 mg under the skin every 2 (two) hours as needed, Disp: , Rfl:   •  fluticasone (FLONASE) 50 mcg/act nasal spray, 2 sprays into each nostril daily, Disp: 16 g, Rfl: 0  •  loratadine (CLARITIN) 10 mg tablet, Take 10 mg by mouth daily, Disp: , Rfl:   •  naproxen (NAPROSYN) 500 mg tablet, Take 1 tablet (500 mg total) by mouth 2 (two) times a day with meals for 10 days, Disp: 20 tablet, Rfl: 0  •  predniSONE 20 mg tablet, Take 3 pills a day for 3 days, then 2 pills a day for 3 days, then 1 pill a day for 3 days  (Patient not taking: Reported on 10/21/2021), Disp: , Rfl:     Current Allergies     Allergies as of 03/09/2023 - Reviewed 03/09/2023   Allergen Reaction Noted   • Bee pollen  11/04/2014   • Pollen extract  11/04/2014   • Gentamicin Eye Swelling 09/18/2014            The following portions of the patient's history were reviewed and updated as appropriate: allergies, current medications, past family history, past medical history, past social history, past surgical history and problem list      Past Medical History:   Diagnosis Date   • Allergic rhinitis    • Chiari I malformation ([facility] )    • Migraine    • 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[facility] )    • Psychiatric disorder        History reviewed  No pertinent surgical history  History reviewed  No pertinent family history  Medications have been verified          Objective   /70 (BP Location: Right arm, Patient Position: Sitting, Cuff Size: Adult)   Pulse 76   Temp 98 °F (36 7 °C) (Tympanic)   Resp 16   SpO2 96%   No LMP recorded  (Menstrual status: Birth Control)  Physical Exam     Physical Exam  Vitals reviewed  Constitutional:       Appearance: Normal appearance  HENT:      Head: Normocephalic and atraumatic  Right Ear: External ear normal       Left Ear: External ear normal       Nose: Nose normal       Mouth/Throat:      Mouth: Mucous membranes are moist       Pharynx: Oropharynx is clear  Eyes:      Conjunctiva/sclera: Conjunctivae normal    Cardiovascular:      Rate and Rhythm: Normal rate and regular rhythm  Pulses: Normal pulses  Pulmonary:      Effort: Pulmonary effort is normal    Musculoskeletal:         General: Normal range of motion  Skin:     General: Skin is warm and dry  Capillary Refill: Capillary refill takes less than 2 seconds  Findings: Erythema and rash present  Neurological:      General: No focal deficit present  Mental Status: She is alert     Psychiatric:         Mood and Affect: Mood normal          Behavior: Behavior normal

## 2023-03-09 NOTE — ASSESSMENT & PLAN NOTE
History and exam findings consistent with dermatophyte infection, likely candidiasis  Wound culture collected and sent to the lab  Rx written for miconazole powder and pt educated to keep skin cool and dry  Reasons to follow up reviewed with pt  All questions answered

## 2023-03-12 LAB
BACTERIA WND AEROBE CULT: ABNORMAL
GRAM STN SPEC: ABNORMAL

## 2023-03-13 ENCOUNTER — OFFICE VISIT (OUTPATIENT)
Dept: URGENT CARE | Facility: CLINIC | Age: 43
End: 2023-03-13

## 2023-03-13 VITALS
TEMPERATURE: 98 F | HEART RATE: 60 BPM | OXYGEN SATURATION: 99 % | SYSTOLIC BLOOD PRESSURE: 116 MMHG | DIASTOLIC BLOOD PRESSURE: 78 MMHG | RESPIRATION RATE: 16 BRPM

## 2023-03-13 DIAGNOSIS — B35.3 TINEA PEDIS OF RIGHT FOOT: ICD-10-CM

## 2023-03-13 DIAGNOSIS — L03.115 CELLULITIS OF RIGHT LOWER EXTREMITY: Primary | ICD-10-CM

## 2023-03-13 DIAGNOSIS — T36.95XA ANTIBIOTIC-INDUCED YEAST INFECTION: ICD-10-CM

## 2023-03-13 DIAGNOSIS — B37.9 ANTIBIOTIC-INDUCED YEAST INFECTION: ICD-10-CM

## 2023-03-13 PROBLEM — L03.90 CELLULITIS: Status: ACTIVE | Noted: 2023-03-13

## 2023-03-13 RX ORDER — FLUCONAZOLE 150 MG/1
150 TABLET ORAL ONCE
Qty: 1 TABLET | Refills: 0 | Status: SHIPPED | OUTPATIENT
Start: 2023-03-13 | End: 2023-03-13

## 2023-03-13 RX ORDER — CEPHALEXIN 500 MG/1
500 CAPSULE ORAL EVERY 6 HOURS SCHEDULED
Qty: 28 CAPSULE | Refills: 0 | Status: SHIPPED | OUTPATIENT
Start: 2023-03-13 | End: 2023-03-20

## 2023-03-13 NOTE — PROGRESS NOTES
[facility] [employer] [facility]        NAME: Lucie Chapman is a 41 y o  female  : 1980    MRN: [id number]  DATE: 2023  TIME: 2:34 PM    Assessment and Plan   Cellulitis of right lower extremity [L03 115]  1  Cellulitis of right lower extremity  cephalexin (KEFLEX) 500 mg capsule      2  Antibiotic-induced yeast infection  fluconazole (DIFLUCAN) 150 mg tablet      3  Tinea pedis of right foot              Patient Instructions       Take Keflex as prescribed  Take with food  Use Miconazole powder as directed on your feet and footwear  Take Diflucan if needed for a yeast infection  Follow up with your PCP or urgent care if symptoms worsen or persist more than 3-5 days  Proceed to  ER if symptoms worsen  Chief Complaint     Chief Complaint   Patient presents with   • Rash     Right foot          History of Present Illness       Right foot rash follow up  She has been using the topical treatment for her tinea pedis on her R foot  Redness and discharge has improved  She has now developed swelling to the dorsum of her R foot x 3 days  Wound culture was positive for S  Aureus and Group B Beta-hemolytic strep  No fever or chills  No streaking, erythema, warmth, or discharge  Review of Systems   Review of Systems   Constitutional: Negative  Negative for chills, fatigue and fever  HENT: Negative  Respiratory: Negative  Negative for cough, shortness of breath and wheezing  Cardiovascular: Negative  Negative for chest pain and palpitations  Musculoskeletal: Negative  Skin: Positive for rash (redness and cracked skin between toes on R foot  Swelling to dorsum of R foot x 3 days)  Allergic/Immunologic: Negative  Neurological: Negative  All other systems reviewed and are negative          Current Medications       Current Outpatient Medications:   •  amLODIPine (NORVASC) 5 mg tablet, Take 1 tablet (5 mg total) by mouth daily, Disp: 30 tablet, Rfl: 6  •  B-D ALLERGY SYRINGE 1CC/28G 28G X 1/2" 1 ML MISC, USE WEEKLY FOR THREE allergy injections AS DIRECTED, Disp: , Rfl: 2  •  Botulinum Toxin Type A 200 units SOLR, Inject 155 units into face and neck IM every 90 days  , Disp: , Rfl:   •  CALCIUM-VITAMIN D PO, Take 1,500 mg by mouth daily, Disp: , Rfl:   •  cephalexin (KEFLEX) 500 mg capsule, Take 1 capsule (500 mg total) by mouth every 6 (six) hours for 7 days, Disp: 28 capsule, Rfl: 0  •  Cholecalciferol (VITAMIN D) 2000 units CAPS, Take 2,000 Units by mouth daily  , Disp: , Rfl:   •  fluconazole (DIFLUCAN) 150 mg tablet, Take 1 tablet (150 mg total) by mouth once for 1 dose, Disp: 1 tablet, Rfl: 0  •  levonorgestrel (MIRENA) 20 MCG/24HR IUD, by Intrauterine route, Disp: , Rfl:   •  Miconazole Nitrate 2 % POWD, Use 2 (two) times a day for 28 days, Disp: 500 g, Rfl: 1  •  Multiple Vitamin (MULTI-VITAMIN DAILY PO), Take by mouth daily , Disp: , Rfl:   •  SUMAtriptan Succinate 6 MG/0 5ML SOCT, Inject 6 mg under the skin every 2 (two) hours as needed, Disp: , Rfl:   •  fluticasone (FLONASE) 50 mcg/act nasal spray, 2 sprays into each nostril daily, Disp: 16 g, Rfl: 0  •  loratadine (CLARITIN) 10 mg tablet, Take 10 mg by mouth daily, Disp: , Rfl:   •  naproxen (NAPROSYN) 500 mg tablet, Take 1 tablet (500 mg total) by mouth 2 (two) times a day with meals for 10 days, Disp: 20 tablet, Rfl: 0  •  predniSONE 20 mg tablet, Take 3 pills a day for 3 days, then 2 pills a day for 3 days, then 1 pill a day for 3 days   (Patient not taking: Reported on 10/21/2021), Disp: , Rfl:     Current Allergies     Allergies as of 03/13/2023 - Reviewed 03/13/2023   Allergen Reaction Noted   • Bee pollen  11/04/2014   • Pollen extract  11/04/2014   • Gentamicin Eye Swelling 09/18/2014            The following portions of the patient's history were reviewed and updated as appropriate: allergies, current medications, past family history, past medical history, past social history, past surgical history and problem list  Past Medical History:   Diagnosis Date   • Allergic rhinitis    • Chiari I malformation ([address] )    • Migraine    • Pituitary cyst [facility])    • Psychiatric disorder        History reviewed  No pertinent surgical history  History reviewed  No pertinent family history  Medications have been verified  Objective   /78 (BP Location: Right arm, Patient Position: Sitting, Cuff Size: Adult)   Pulse 60   Temp 98 °F (36 7 °C) (Tympanic)   Resp 16   SpO2 99%   No LMP recorded  (Menstrual status: Birth Control)  Physical Exam     Physical Exam  Vitals reviewed  Constitutional:       Appearance: Normal appearance  She is well-developed  HENT:      Head: Normocephalic and atraumatic  Right Ear: External ear normal       Left Ear: External ear normal       Nose: Nose normal       Mouth/Throat:      Mouth: Mucous membranes are moist       Pharynx: Oropharynx is clear  Cardiovascular:      Rate and Rhythm: Normal rate and regular rhythm  Pulses: Normal pulses  Pulmonary:      Effort: Pulmonary effort is normal    Musculoskeletal:         General: Normal range of motion  Left foot: Normal capillary refill  Swelling (dorsum of foot) present  Normal pulse  Comments: No pitting, warmth, discharge, or erythema  Skin:     General: Skin is warm and dry  Capillary Refill: Capillary refill takes less than 2 seconds  Findings: Erythema and rash present  Comments: Fissures between toes and erythema  Discharge is resolved  Erythema is decreased and discomfort is improved  Neurological:      General: No focal deficit present  Mental Status: She is alert and oriented to person, place, and time     Psychiatric:         Mood and Affect: Mood normal          Behavior: Behavior normal

## 2023-03-13 NOTE — ASSESSMENT & PLAN NOTE
Pt reports hx vaginal candidiasis after taking antibiotics  Rx written for PO diflucan to take prn if symptoms develop

## 2023-03-13 NOTE — ASSESSMENT & PLAN NOTE
Wound culture revealed S  Aureus and Group B beta-hemolytic strep  Suspect this is secondary to open skin from tinea pedis of R foot  Rx written for Keflex  Pt educated on wound care, how to take antibiotic, and reasons to follow up  All questions answered

## 2023-03-13 NOTE — PATIENT INSTRUCTIONS
Take Keflex as prescribed  Take with food  Use Miconazole powder as directed on your feet and footwear  Take Diflucan if needed for a yeast infection  Follow up with your PCP or urgent care if symptoms worsen or persist more than 3-5 days

## 2023-04-03 ENCOUNTER — OFFICE VISIT (OUTPATIENT)
Dept: URGENT CARE | Facility: CLINIC | Age: 43
End: 2023-04-03

## 2023-04-03 VITALS
SYSTOLIC BLOOD PRESSURE: 120 MMHG | RESPIRATION RATE: 16 BRPM | OXYGEN SATURATION: 98 % | HEART RATE: 63 BPM | TEMPERATURE: 98.2 F | DIASTOLIC BLOOD PRESSURE: 80 MMHG

## 2023-04-03 DIAGNOSIS — B35.3 TINEA PEDIS OF RIGHT FOOT: Primary | ICD-10-CM

## 2023-04-03 RX ORDER — MICONAZOLE NITRATE ANTIFUNGAL POWDER 2 G/100G
POWDER TOPICAL
COMMUNITY
Start: 2023-03-13 | End: 2023-04-03 | Stop reason: ALTCHOICE

## 2023-04-03 RX ORDER — PRENATAL VIT 91/IRON/FOLIC/DHA 28-975-200
COMBINATION PACKAGE (EA) ORAL 2 TIMES DAILY
Qty: 30 G | Refills: 2 | Status: SHIPPED | OUTPATIENT
Start: 2023-04-03 | End: 2023-04-24

## 2023-04-03 NOTE — PROGRESS NOTES
"  [address]        NAME: Stephan Jay is a 41 y o  female  : 1980    MRN: [id number]  DATE: April 3, 2023  TIME: 2:39 PM    Assessment and Plan   Tinea pedis of right foot [B35 3]  1  Tinea pedis of right foot  Ambulatory Referral to Podiatry    terbinafine (LamISIL) 1 % cream            Patient Instructions       Use Terbinafine cream as prescribed  Keep feet clean and dry  Avoid sweating  Follow up with the podiatrist   Please call with any questions or concerns  Proceed to  ER if symptoms worsen  Chief Complaint     Chief Complaint   Patient presents with   • Rash         History of Present Illness       Pt presents for re-check of her R foot rash  Redness between toes, mild swelling to foot, and pain between toes and dorsum of R foot x 1 week  She was tx for cellulitis with Keflex 3 weeks ago and has been using powder for yeast infection- symptoms improved but returned  No fever or chills  No inuries or other exposures  No other new treatments tried         Review of Systems   Review of Systems      Current Medications       Current Outpatient Medications:   •  amLODIPine (NORVASC) 5 mg tablet, Take 1 tablet (5 mg total) by mouth daily, Disp: 30 tablet, Rfl: 6  •  CALCIUM-VITAMIN D PO, Take 1,500 mg by mouth daily, Disp: , Rfl:   •  Cholecalciferol (VITAMIN D) 2000 units CAPS, Take 2,000 Units by mouth daily  , Disp: , Rfl:   •  levonorgestrel (MIRENA) 20 MCG/24HR IUD, by Intrauterine route, Disp: , Rfl:   •  Multiple Vitamin (MULTI-VITAMIN DAILY PO), Take by mouth daily , Disp: , Rfl:   •  SUMAtriptan Succinate 6 MG/0 5ML SOCT, Inject 6 mg under the skin every 2 (two) hours as needed, Disp: , Rfl:   •  terbinafine (LamISIL) 1 % cream, Apply topically 2 (two) times a day for 21 days, Disp: 30 g, Rfl: 2  •  B-D ALLERGY SYRINGE 1CC/28G 28G X 1/2\" 1 ML MISC, USE WEEKLY FOR THREE allergy injections AS DIRECTED, Disp: , Rfl: 2  •  Botulinum Toxin Type A 200 units SOLR, Inject 155 " units into face and neck IM every 90 days  , Disp: , Rfl:   •  fluticasone (FLONASE) 50 mcg/act nasal spray, 2 sprays into each nostril daily, Disp: 16 g, Rfl: 0  •  loratadine (CLARITIN) 10 mg tablet, Take 10 mg by mouth daily, Disp: , Rfl:   •  naproxen (NAPROSYN) 500 mg tablet, Take 1 tablet (500 mg total) by mouth 2 (two) times a day with meals for 10 days, Disp: 20 tablet, Rfl: 0  •  predniSONE 20 mg tablet, Take 3 pills a day for 3 days, then 2 pills a day for 3 days, then 1 pill a day for 3 days  (Patient not taking: Reported on 10/21/2021), Disp: , Rfl:     Current Allergies     Allergies as of 04/03/2023 - Reviewed 04/03/2023   Allergen Reaction Noted   • Bee pollen  11/04/2014   • Pollen extract  11/04/2014   • Gentamicin Eye Swelling 09/18/2014            The following portions of the patient's history were reviewed and updated as appropriate: allergies, current medications, past family history, past medical history, past social history, past surgical history and problem list      Past Medical History:   Diagnosis Date   • Allergic rhinitis    • Chiari I malformation ([facility] [address] )    • Migraine    • 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[facility] [address] )    • Psychiatric disorder        History reviewed  No pertinent surgical history  History reviewed  No pertinent family history  Medications have been verified  Objective   /80 (BP Location: Right arm, Patient Position: Sitting, Cuff Size: Adult)   Pulse 63   Temp 98 2 °F (36 8 °C) (Tympanic)   Resp 16   SpO2 98%   No LMP recorded  (Menstrual status: Birth Control)  Physical Exam     Physical Exam  Vitals reviewed  Constitutional:       Appearance: Normal appearance  She is well-developed  HENT:      Head: Normocephalic and atraumatic  Cardiovascular:      Rate and Rhythm: Normal rate and regular rhythm  Pulses: Normal pulses  Pulmonary:      Effort: Pulmonary effort is normal    Musculoskeletal:         General: No deformity or signs of injury   Normal range of motion  Right lower leg: No edema  Left lower leg: Normal  No edema  Right foot: Normal capillary refill  Swelling (scant) and tenderness present  No deformity or bony tenderness  Normal pulse  Left foot: Normal    Skin:     General: Skin is warm and dry  Capillary Refill: Capillary refill takes less than 2 seconds  Findings: Erythema and rash present  Rash is scaling  Comments: inflamed skin between toes on right foot with peeling and scaling  Few open blisters noted  Neurological:      General: No focal deficit present  Mental Status: She is alert and oriented to person, place, and time     Psychiatric:         Mood and Affect: Mood normal          Behavior: Behavior normal

## 2023-04-03 NOTE — ASSESSMENT & PLAN NOTE
Symptoms improved but worsened 1 week ago  Rx changed to Terbinafine cream, Miconazole powder discontinued  Referral to podiatry also provided due to ongoing tinea infection  Recommend keeping skin cool and dry  All questions answered

## 2023-04-03 NOTE — PATIENT INSTRUCTIONS
Use Terbinafine cream as prescribed  Keep feet clean and dry  Avoid sweating  Follow up with the podiatrist   Please call with any questions or concerns

## 2023-05-16 ENCOUNTER — OFFICE VISIT (OUTPATIENT)
Dept: PODIATRY | Facility: CLINIC | Age: 43
End: 2023-05-16
Payer: COMMERCIAL

## 2023-05-16 VITALS
HEIGHT: 72 IN | SYSTOLIC BLOOD PRESSURE: 121 MMHG | WEIGHT: 157 LBS | DIASTOLIC BLOOD PRESSURE: 81 MMHG | BODY MASS INDEX: 21.26 KG/M2 | HEART RATE: 67 BPM

## 2023-05-16 DIAGNOSIS — B35.3 TINEA PEDIS OF RIGHT FOOT: Primary | ICD-10-CM

## 2023-05-16 PROCEDURE — 99212 OFFICE O/P EST SF 10 MIN: CPT | Performed by: PODIATRIST

## 2023-05-16 RX ORDER — FLUCONAZOLE 150 MG/1
150 TABLET ORAL ONCE
COMMUNITY
Start: 2023-03-13 | End: 2023-06-15 | Stop reason: SDUPTHER

## 2023-06-04 NOTE — PROGRESS NOTES
Assessment/Plan:   Continue with current alternating Lotrisone/terbinafine creams and add possibly miconazole powder which is available OTC  Follow-up in 2 months  Diagnoses and all orders for this visit:    Tinea pedis of right foot    Other orders  -     fluconazole (DIFLUCAN) 150 mg tablet; Take 150 mg by mouth once          Subjective:     Patient ID: Francesca Cosme is a 41 y o  female  5/16/2023: 79-year-old female seen today for follow-up reports right foot 80-90% improvement overall since last visit and no longer has blisters  Noted though over the past week she started to see some recurrence  She is alternating Lotrisone and terbinafine creams every other day  34/2023: 79-year-old white female presents for evaluation of swollen right foot with rash between her toes which was blistering and weeping  Reports approximate 2-month duration and was seen at urgent care on 3 different occasions from early March into the first week of April  Currently taking a finish taking a course of Keflex and is using nystatin cream       Review of Systems   Constitutional: Negative  HENT: Negative  Eyes: Negative  Respiratory: Negative  Cardiovascular: Negative  Gastrointestinal: Negative  Endocrine: Negative  Genitourinary: Negative  Musculoskeletal: Negative  Skin: Positive for rash (Chronic recurring interdigital tinea right foot)  Allergic/Immunologic: Negative  Neurological: Negative  Hematological: Negative  Psychiatric/Behavioral: Negative  Objective:     Physical Exam  Constitutional:       Appearance: Normal appearance  HENT:      Head: Normocephalic  Right Ear: External ear normal       Left Ear: External ear normal    Eyes:      Pupils: Pupils are equal, round, and reactive to light  Cardiovascular:      Rate and Rhythm: Normal rate  Pulses: Normal pulses     Pulmonary:      Effort: Pulmonary effort is normal    Musculoskeletal: General: Normal range of motion  Cervical back: Normal range of motion  Feet:      Right foot:      Protective Sensation: 10 sites tested  10 sites sensed  Skin integrity: Dry skin present  Toenail Condition: Right toenails are normal       Left foot:      Protective Sensation: 10 sites tested  10 sites sensed  Skin integrity: Skin integrity normal       Toenail Condition: Left toenails are normal    Skin:     General: Skin is warm and dry  Capillary Refill: Capillary refill takes 2 to 3 seconds  Comments: Increased scaling interdigital right foot consistent with tinea   Neurological:      General: No focal deficit present  Mental Status: She is alert     Psychiatric:         Mood and Affect: Mood normal

## 2023-06-15 ENCOUNTER — OFFICE VISIT (OUTPATIENT)
Dept: URGENT CARE | Facility: CLINIC | Age: 43
End: 2023-06-15

## 2023-06-15 VITALS
RESPIRATION RATE: 16 BRPM | SYSTOLIC BLOOD PRESSURE: 130 MMHG | OXYGEN SATURATION: 97 % | HEART RATE: 70 BPM | DIASTOLIC BLOOD PRESSURE: 80 MMHG | TEMPERATURE: 98.4 F

## 2023-06-15 DIAGNOSIS — B37.31 VAGINAL CANDIDIASIS: ICD-10-CM

## 2023-06-15 DIAGNOSIS — N30.00 ACUTE CYSTITIS WITHOUT HEMATURIA: Primary | ICD-10-CM

## 2023-06-15 PROBLEM — N30.91 CYSTITIS WITH HEMATURIA: Status: ACTIVE | Noted: 2023-06-15

## 2023-06-15 LAB
SL AMB  POCT GLUCOSE, UA: NEGATIVE
SL AMB LEUKOCYTE ESTERASE,UA: 125
SL AMB POCT BILIRUBIN,UA: NEGATIVE
SL AMB POCT BLOOD,UA: NEGATIVE
SL AMB POCT CLARITY,UA: ABNORMAL
SL AMB POCT COLOR,UA: YELLOW
SL AMB POCT KETONES,UA: NEGATIVE
SL AMB POCT NITRITE,UA: NEGATIVE
SL AMB POCT PH,UA: 6
SL AMB POCT SPECIFIC GRAVITY,UA: 1.01
SL AMB POCT URINE PROTEIN: ABNORMAL
SL AMB POCT UROBILINOGEN: NEGATIVE

## 2023-06-15 PROCEDURE — 87147 CULTURE TYPE IMMUNOLOGIC: CPT | Performed by: NURSE PRACTITIONER

## 2023-06-15 PROCEDURE — 87086 URINE CULTURE/COLONY COUNT: CPT | Performed by: NURSE PRACTITIONER

## 2023-06-15 RX ORDER — NITROFURANTOIN 25; 75 MG/1; MG/1
100 CAPSULE ORAL 2 TIMES DAILY
Qty: 14 CAPSULE | Refills: 0 | Status: SHIPPED | OUTPATIENT
Start: 2023-06-15 | End: 2023-06-22

## 2023-06-15 RX ORDER — FLUCONAZOLE 150 MG/1
150 TABLET ORAL ONCE
Qty: 1 TABLET | Refills: 0 | Status: SHIPPED | OUTPATIENT
Start: 2023-06-15 | End: 2023-06-15

## 2023-06-15 NOTE — PROGRESS NOTES
[address]        NAME: Felisha Krishnamurthy is a 41 y o  female  : 1980    MRN: [id number]  DATE: Monique 15, 2023  TIME: 2:43 PM    Assessment and Plan   Acute cystitis without hematuria [N30 00]  1  Acute cystitis without hematuria  POCT urine dip    Urine culture    nitrofurantoin (MACROBID) 100 mg capsule      2  Vaginal candidiasis  fluconazole (DIFLUCAN) 150 mg tablet            Patient Instructions     Take Macrobid as prescribed  Take with food  Increase water intake  Follow up with your PCP or return to the clinic if symptoms worsen or persist more than 2-3 days  Proceed to  ER if symptoms worsen  Chief Complaint     Chief Complaint   Patient presents with   • Possible UTI         History of Present Illness       Pt c/o vaginal discharge (thick, white colored), lower abdominal bloating & pressure, burning in vaginal area x 5 days  Urinary urgency, frequency, and urinating small amounts  No abdominal pain or flank pain  She was on a boat wearing a swimsuit for a long period of time prior to onset  No fever or chills  She has not taken any medication for this yet  Review of Systems   Review of Systems   Constitutional: Negative  Negative for chills, fatigue and fever  HENT: Negative  Eyes: Negative  Respiratory: Negative  Negative for cough, shortness of breath and wheezing  Cardiovascular: Negative  Negative for chest pain and palpitations  Gastrointestinal: Negative  Negative for abdominal pain (lower abdominal pressure), diarrhea, nausea and vomiting  Genitourinary: Positive for frequency, urgency, vaginal discharge and vaginal pain (burning)  Negative for dysuria, flank pain, hematuria and vaginal bleeding  Musculoskeletal: Negative  Skin: Negative for rash  All other systems reviewed and are negative          Current Medications       Current Outpatient Medications:   •  amLODIPine (NORVASC) 5 mg tablet, Take 1 tablet (5 mg total) by mouth daily, "Disp: 30 tablet, Rfl: 6  •  B-D ALLERGY SYRINGE 1CC/28G 28G X 1/2\" 1 ML MISC, USE WEEKLY FOR THREE allergy injections AS DIRECTED, Disp: , Rfl: 2  •  Botulinum Toxin Type A 200 units SOLR, Inject 155 units into face and neck IM every 90 days  , Disp: , Rfl:   •  CALCIUM-VITAMIN D PO, Take 1,500 mg by mouth daily, Disp: , Rfl:   •  Cholecalciferol (VITAMIN D) 2000 units CAPS, Take 2,000 Units by mouth daily  , Disp: , Rfl:   •  clotrimazole-betamethasone (LOTRISONE) 1-0 05 % cream, Apply topically 2 (two) times a day, Disp: 30 g, Rfl: 1  •  fluconazole (DIFLUCAN) 150 mg tablet, Take 1 tablet (150 mg total) by mouth once for 1 dose, Disp: 1 tablet, Rfl: 0  •  levonorgestrel (MIRENA) 20 MCG/24HR IUD, by Intrauterine route, Disp: , Rfl:   •  Multiple Vitamin (MULTI-VITAMIN DAILY PO), Take by mouth daily , Disp: , Rfl:   •  nitrofurantoin (MACROBID) 100 mg capsule, Take 1 capsule (100 mg total) by mouth 2 (two) times a day for 7 days, Disp: 14 capsule, Rfl: 0  •  SUMAtriptan Succinate 6 MG/0 5ML SOCT, Inject 6 mg under the skin every 2 (two) hours as needed, Disp: , Rfl:   •  fluticasone (FLONASE) 50 mcg/act nasal spray, 2 sprays into each nostril daily, Disp: 16 g, Rfl: 0  •  loratadine (CLARITIN) 10 mg tablet, Take 10 mg by mouth daily, Disp: , Rfl:   •  naproxen (NAPROSYN) 500 mg tablet, Take 1 tablet (500 mg total) by mouth 2 (two) times a day with meals for 10 days, Disp: 20 tablet, Rfl: 0  •  predniSONE 20 mg tablet, Take 3 pills a day for 3 days, then 2 pills a day for 3 days, then 1 pill a day for 3 days   (Patient not taking: Reported on 10/21/2021), Disp: , Rfl:   •  terbinafine (LamISIL) 1 % cream, Apply topically 2 (two) times a day for 21 days, Disp: 30 g, Rfl: 2    Current Allergies     Allergies as of 06/15/2023 - Reviewed 06/15/2023   Allergen Reaction Noted   • Bee pollen  11/04/2014   • Pollen extract  11/04/2014   • Gentamicin Eye Swelling 09/18/2014            The following portions of the patient's " history were reviewed and updated as appropriate: allergies, current medications, past family history, past medical history, past social history, past surgical history and problem list      Past Medical History:   Diagnosis Date   • Allergic rhinitis    • Chiari I malformation ([facility] [address] )    • Migraine    • Pituitary cyst [facility])    • Psychiatric disorder        History reviewed  No pertinent surgical history  History reviewed  No pertinent family history  Medications have been verified  Objective   /80 (BP Location: Right arm, Patient Position: Sitting, Cuff Size: Adult)   Pulse 70   Temp 98 4 °F (36 9 °C) (Tympanic)   Resp 16   SpO2 97%   No LMP recorded  (Menstrual status: Birth Control)  Physical Exam     Physical Exam  Vitals reviewed  Constitutional:       Appearance: Normal appearance  She is well-developed  HENT:      Head: Normocephalic and atraumatic  Cardiovascular:      Rate and Rhythm: Normal rate and regular rhythm  Pulses: Normal pulses  Pulmonary:      Effort: Pulmonary effort is normal    Abdominal:      General: Bowel sounds are normal       Palpations: Abdomen is soft  Tenderness: There is abdominal tenderness (suprapubic- mild) in the suprapubic area  There is no right CVA tenderness or left CVA tenderness  Musculoskeletal:         General: Normal range of motion  Skin:     General: Skin is warm and dry  Capillary Refill: Capillary refill takes less than 2 seconds  Neurological:      General: No focal deficit present  Mental Status: She is alert and oriented to person, place, and time     Psychiatric:         Mood and Affect: Mood normal          Behavior: Behavior normal

## 2023-06-15 NOTE — PATIENT INSTRUCTIONS
Take Macrobid as prescribed  Take with food  Increase water intake  Follow up with your PCP or return to the clinic if symptoms worsen or persist more than 2-3 days

## 2023-06-15 NOTE — ASSESSMENT & PLAN NOTE
History and exam findings consistent with cystitis  Urine dip revealed leukocytes and trace protein  Rx written for Macrobid  Recommend increasing water intake  Reasons to follow up reviewed with pt  All questions answered

## 2023-06-15 NOTE — ASSESSMENT & PLAN NOTE
Pt also has symptoms of vaginal yeast infection  Rx for PO Diflucan written  Pt educated to change out of wet bathing suits ASAP and keep skin cool and dry

## 2023-06-17 LAB
BACTERIA UR CULT: ABNORMAL
BACTERIA UR CULT: ABNORMAL

## 2023-08-14 PROBLEM — N30.00 ACUTE CYSTITIS: Status: RESOLVED | Noted: 2023-06-15 | Resolved: 2023-08-14

## 2023-08-17 ENCOUNTER — OFFICE VISIT (OUTPATIENT)
Dept: PODIATRY | Facility: CLINIC | Age: 43
End: 2023-08-17
Payer: COMMERCIAL

## 2023-08-17 VITALS
HEART RATE: 59 BPM | SYSTOLIC BLOOD PRESSURE: 128 MMHG | WEIGHT: 156 LBS | BODY MASS INDEX: 21.13 KG/M2 | DIASTOLIC BLOOD PRESSURE: 85 MMHG | HEIGHT: 72 IN

## 2023-08-17 DIAGNOSIS — B35.3 TINEA PEDIS OF RIGHT FOOT: Primary | ICD-10-CM

## 2023-08-17 PROCEDURE — 99213 OFFICE O/P EST LOW 20 MIN: CPT | Performed by: PODIATRIST

## 2023-08-17 RX ORDER — FLUCONAZOLE 150 MG/1
150 TABLET ORAL ONCE
COMMUNITY
Start: 2023-06-15

## 2023-10-09 NOTE — PROGRESS NOTES
Assessment/Plan:   Chronic tinea has resolved 100%, patient to continue with her current regimen of pedal hygiene and as needed for tinea outbreaks. May require suppressive therapy with once or twice a week application. .  Patient instructed follow-up as needed. Diagnoses and all orders for this visit:    Tinea pedis of right foot    Other orders  -     fluconazole (DIFLUCAN) 150 mg tablet; Take 150 mg by mouth once          Subjective:     Patient ID: Cosme Crigler is a 41 y.o. female. 8/17/2023: 62-year-old female seen today for follow-up chronic tinea right foot and reports no issues or concerns with apparent resolution of tinea pedis. 5/16/2023: 62-year-old female seen today for follow-up reports right foot 80-90% improvement overall since last visit and no longer has blisters. Noted though over the past week she started to see some recurrence. She is alternating Lotrisone and terbinafine creams every other day. 34/2023: 62-year-old white female presents for evaluation of swollen right foot with rash between her toes which was blistering and weeping. Reports approximate 2-month duration and was seen at urgent care on 3 different occasions from early March into the first week of April. Currently taking a finish taking a course of Keflex and is using nystatin cream.      Review of Systems   Constitutional: Negative. HENT: Negative. Eyes: Negative. Respiratory: Negative. Cardiovascular: Negative. Gastrointestinal: Negative. Endocrine: Negative. Genitourinary: Negative. Musculoskeletal: Negative. Skin: Positive for rash (resolved 100%). Allergic/Immunologic: Negative. Hematological: Negative. Psychiatric/Behavioral: Negative. Objective:     Physical Exam  Constitutional:       Appearance: Normal appearance. HENT:      Head: Normocephalic.       Right Ear: External ear normal.      Left Ear: External ear normal.   Eyes:      Pupils: Pupils are equal, round, and reactive to light. Cardiovascular:      Rate and Rhythm: Normal rate. Pulses: Normal pulses. Pulmonary:      Effort: Pulmonary effort is normal.   Musculoskeletal:         General: Normal range of motion. Cervical back: Normal range of motion. Feet:      Right foot:      Protective Sensation: 10 sites tested. 10 sites sensed. Skin integrity: Dry skin present. Left foot:      Protective Sensation: 10 sites tested. 10 sites sensed. Skin integrity: Dry skin present. Skin:     General: Skin is warm and dry. Capillary Refill: Capillary refill takes 2 to 3 seconds. Comments: InterDigital rash right foot has resolved completely. Neurological:      General: No focal deficit present. Mental Status: She is alert.    Psychiatric:         Mood and Affect: Mood normal.

## 2024-02-21 PROBLEM — J01.00 ACUTE NON-RECURRENT MAXILLARY SINUSITIS: Status: RESOLVED | Noted: 2019-05-21 | Resolved: 2024-02-21

## 2024-09-30 ENCOUNTER — APPOINTMENT (OUTPATIENT)
Dept: URGENT CARE | Facility: CLINIC | Age: 44
End: 2024-09-30

## 2024-09-30 DIAGNOSIS — Z00.00 WELL ADULT EXAM: Primary | ICD-10-CM

## 2024-09-30 LAB
CHOLEST SERPL-MCNC: 169 MG/DL
EST. AVERAGE GLUCOSE BLD GHB EST-MCNC: 97 MG/DL
HBA1C MFR BLD: 5 %
HDLC SERPL-MCNC: 72 MG/DL
LDLC SERPL CALC-MCNC: 82 MG/DL (ref 0–100)
NONHDLC SERPL-MCNC: 97 MG/DL
TRIGL SERPL-MCNC: 76 MG/DL

## 2024-09-30 PROCEDURE — 80061 LIPID PANEL: CPT

## 2024-09-30 PROCEDURE — 83036 HEMOGLOBIN GLYCOSYLATED A1C: CPT

## 2024-10-16 DIAGNOSIS — Z23 NEED FOR IMMUNIZATION AGAINST INFLUENZA: Primary | ICD-10-CM

## 2024-12-17 DIAGNOSIS — I73.00 RAYNAUD'S PHENOMENON WITHOUT GANGRENE: ICD-10-CM

## 2024-12-19 RX ORDER — AMLODIPINE BESYLATE 5 MG/1
5 TABLET ORAL DAILY
Qty: 30 TABLET | Refills: 0 | Status: SHIPPED | OUTPATIENT
Start: 2024-12-19

## 2025-01-30 ENCOUNTER — OFFICE VISIT (OUTPATIENT)
Dept: ENDOCRINOLOGY | Facility: CLINIC | Age: 45
End: 2025-01-30
Payer: COMMERCIAL

## 2025-01-30 VITALS
BODY MASS INDEX: 22.08 KG/M2 | DIASTOLIC BLOOD PRESSURE: 80 MMHG | SYSTOLIC BLOOD PRESSURE: 120 MMHG | WEIGHT: 163 LBS | OXYGEN SATURATION: 98 % | HEART RATE: 70 BPM | HEIGHT: 72 IN

## 2025-01-30 DIAGNOSIS — K59.00 CONSTIPATION, UNSPECIFIED CONSTIPATION TYPE: ICD-10-CM

## 2025-01-30 DIAGNOSIS — Z87.81 HISTORY OF HIP FRACTURE: ICD-10-CM

## 2025-01-30 DIAGNOSIS — G93.5 CHIARI I MALFORMATION (HCC): ICD-10-CM

## 2025-01-30 DIAGNOSIS — E55.9 VITAMIN D DEFICIENCY: ICD-10-CM

## 2025-01-30 DIAGNOSIS — D35.2 PITUITARY ADENOMA (HCC): Primary | ICD-10-CM

## 2025-01-30 DIAGNOSIS — E23.6 PITUITARY MASS (HCC): ICD-10-CM

## 2025-01-30 DIAGNOSIS — R74.8 LOW SERUM ALKALINE PHOSPHATASE: ICD-10-CM

## 2025-01-30 PROCEDURE — 99204 OFFICE O/P NEW MOD 45 MIN: CPT | Performed by: INTERNAL MEDICINE

## 2025-01-30 NOTE — ASSESSMENT & PLAN NOTE
Will check anterior pituitary hormonal panel and repeat imaging-further management will depend on the results

## 2025-01-30 NOTE — LETTER
January 30, 2025     Omer Russell MD  [address] [place] [id number]    Patient: Lesly Vergara   YOB: 1980   Date of Visit: 1/30/2025       Dear Dr. Russell:    Thank you for referring Lesly Vergara to me for evaluation. Below are my notes for this consultation.    If you have questions, please do not hesitate to call me. I look forward to following your patient along with you.         Sincerely,        Sujata Skaggs MD        CC: No Recipients    Sujata Skaggs MD  1/30/2025  2:21 PM  Sign when Signing Visit   Lesly Vergara 45 y.o. female MRN: [id number]    Encounter: [id number]      Assessment & Plan  Problem List Items Addressed This Visit          Endocrine    Pituitary adenoma (HCC) - Primary    Will check anterior pituitary hormonal panel and repeat imaging-further management will depend on the results         Relevant Orders    Comprehensive metabolic panel    TSH, 3rd generation    T4, free    IGF-1    Alpha Subunit, Free    Cortisol Level,7-9 AM Specimen    ACTH    MRI brain pituitary wo and w contrast    Prolactin       Nervous and Auditory    Chiari I malformation (HCC)    Repeat imaging and follow-up with neurology         Relevant Orders    MRI brain pituitary wo and w contrast       Orthopedic/Musculoskeletal    History of hip fracture    Patient states hip fracture at age 23-27, did not require surgery-was on Forteo for a year after that.  Has had no further fractures since then and last BMD was normal.  Repeat DEXA scan         Relevant Orders    Comprehensive metabolic panel    DXA bone density spine hip and pelvis       Neurology/Sleep    Pituitary mass (HCC)    Relevant Orders    MRI brain pituitary wo and w contrast       Other    Constipation    Relevant Orders    Celiac Disease Panel    Low serum alkaline phosphatase    Relevant Orders    Alkaline phosphatase, bone specific    Vitamin B6    Phosphorus    Magnesium    DXA bone density spine hip and  pelvis    Vitamin D deficiency    Continue supplementations-we will check vitamin D 25-hydroxy         Relevant Orders    Comprehensive metabolic panel    Vitamin D 25 hydroxy    DXA bone density spine hip and pelvis      CC: Pituitary macroadenoma      History of Present Illness    HPI:  45-year-old female referred here for evaluation of pituitary macroadenoma-initially noted on imaging about 10 years back.  She had workup done in 2016 and it appears that adenoma was nonfunctioning-stable on imaging-last MRI in 2019  Recently saw her ophthalmologist and was noted to have worsening Nasal field loss - worsening as per optho-not related to pituitary macroadenoma however was advised to follow-up with both neurology as well as endocrinology    No vision changes   Headaches - worsening for last year   Last saw neurology 5 years back     No changes in shoe or ring size   Weight stable   No muscle weakness      Has mirena IUD for 10 years - had irregular menstrual cycles since age 18 , skipping month   No galactorrhea     1 pregnancy - when she was 20    Night sweats /hot flashes for the past year  most nights a week       Hip fracture age [age]   Just walking and fell off a curb -was non weight bearing for a certain timeframe after that, did not require surgery   Was on forteo for a year at that time     No other low trauma fractures , no unsteady gait     Was an athlete , 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[age]  , on OCP age [age]-     Usually 2-3 BM/week       Review of Systems    Historical Information  Past Medical History:   Diagnosis Date   • Allergic rhinitis    • Chiari I malformation (HCC)    • Migraine    • 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HCC)    • Psychiatric disorder      Past Surgical History:   Procedure Laterality Date   • KNEE SURGERY       Social History  Social History     Substance and Sexual Activity   Alcohol Use Yes   • Alcohol/week: 6.0 standard drinks of alcohol   • Types: 6 Standard drinks or equivalent per week    Comment: frequently  "    Social History     Substance and Sexual Activity   Drug Use No     Social History     Tobacco Use   Smoking Status Never   Smokeless Tobacco Never     Family History:   Family History   Problem Relation Age of Onset   • Osteoporosis Mother    • Coronary artery disease Mother    • Coronary artery disease Father    • No Known Problems Sister    • Migraines Brother        Meds/Allergies  Current Outpatient Medications   Medication Sig Dispense Refill   • amLODIPine (NORVASC) 5 mg tablet Take 1 tablet (5 mg total) by mouth daily 30 tablet 0   • B-D ALLERGY SYRINGE 1CC/28G 28G X 1/2\" 1 ML MISC USE WEEKLY FOR THREE allergy injections AS DIRECTED  2   • CALCIUM-VITAMIN D PO Take 1,500 mg by mouth daily     • Cholecalciferol (VITAMIN D) 2000 units CAPS Take 2,000 Units by mouth daily       • fluticasone (FLONASE) 50 mcg/act nasal spray 2 sprays into each nostril daily 16 g 0   • levonorgestrel (MIRENA) 20 MCG/24HR IUD by Intrauterine route     • Multiple Vitamin (MULTI-VITAMIN DAILY PO) Take by mouth daily      • SUMAtriptan Succinate 6 MG/0.5ML SOCT Inject 6 mg under the skin every 2 (two) hours as needed       No current facility-administered medications for this visit.     Allergies   Allergen Reactions   • Bee Pollen    • Pollen Extract    • Gentamicin Eye Swelling     Eye ointment only, tolerates flu shot with no problems.       Objective  Vitals: Blood pressure 120/80, pulse 70, height 6' (1.829 m), weight 73.9 kg (163 lb), SpO2 98%, not currently breastfeeding.    Physical Exam  Vitals reviewed.   Constitutional:       General: She is not in acute distress.     Appearance: Normal appearance. She is not ill-appearing, toxic-appearing or diaphoretic.   HENT:      Head: Normocephalic and atraumatic.   Eyes:      General: No scleral icterus.     Extraocular Movements: Extraocular movements intact.   Cardiovascular:      Rate and Rhythm: Normal rate and regular rhythm.      Heart sounds: Normal heart sounds. No murmur " "heard.  Pulmonary:      Effort: Pulmonary effort is normal. No respiratory distress.      Breath sounds: Normal breath sounds. No wheezing or rales.   Musculoskeletal:      Cervical back: Neck supple.      Right lower leg: No edema.      Left lower leg: No edema.   Lymphadenopathy:      Cervical: No cervical adenopathy.   Skin:     General: Skin is warm and dry.   Neurological:      General: No focal deficit present.      Mental Status: She is alert and oriented to person, place, and time.      Gait: Gait normal.   Psychiatric:         Mood and Affect: Mood normal.         Behavior: Behavior normal.         Thought Content: Thought content normal.         Judgment: Judgment normal.         The history was obtained from the review of the chart, patient.    Lab Results:              Imaging Studies:  Results for orders placed during the hospital encounter of 04/22/19    MRI brain pituitary wo and w contrast    Impression  Stable MR appearance of the brain.  12 x 8 x 8 mm hypoenhancing pituitary mass consistent with adenoma.    Workstation performed: [id number]         Results Review Statement: I reviewed radiology reports from this admission including: MRI brain.    Portions of the record may have been created with voice recognition software. Occasional wrong word or \"sound a like\" substitutions may have occurred due to the inherent limitations of voice recognition software. Read the chart carefully and recognize, using context, where substitutions have occurred.    "

## 2025-01-30 NOTE — ASSESSMENT & PLAN NOTE
September 13, 2024     Patient: Johnna Hill   YOB: 1962   Date of Visit: 9/13/2024       To Whom it May Concern:  FAX (    Johnna Hill was seen in my clinic on 9/13/2024 at 1:40 pm.    Please excuse Johnna for her absence from work on the date September 10-13 for illness  Can return to work on 9-16-24    Sincerely,       ELECTRONICALLY SIGNED BY VENANCIO Solomon MD    Medical information is confidential and cannot be disclosed without the written consent of the patient or her representative.     Patient states hip fracture at age 23-27, did not require surgery-was on Forteo for a year after that.  Has had no further fractures since then and last BMD was normal.  Repeat DEXA scan

## 2025-01-30 NOTE — PROGRESS NOTES
Lesly Vergara 45 y.o. female MRN: [id number]    Encounter: [id number]      Assessment & Plan   Problem List Items Addressed This Visit          Endocrine    Pituitary adenoma (HCC) - Primary    Will check anterior pituitary hormonal panel and repeat imaging-further management will depend on the results         Relevant Orders    Comprehensive metabolic panel    TSH, 3rd generation    T4, free    IGF-1    Alpha Subunit, Free    Cortisol Level,7-9 AM Specimen    ACTH    MRI brain pituitary wo and w contrast    Prolactin       Nervous and Auditory    Chiari I malformation (HCC)    Repeat imaging and follow-up with neurology         Relevant Orders    MRI brain pituitary wo and w contrast       Orthopedic/Musculoskeletal    History of hip fracture    Patient states hip fracture at age 23-27, did not require surgery-was on Forteo for a year after that.  Has had no further fractures since then and last BMD was normal.  Repeat DEXA scan         Relevant Orders    Comprehensive metabolic panel    DXA bone density spine hip and pelvis       Neurology/Sleep    Pituitary mass (HCC)    Relevant Orders    MRI brain pituitary wo and w contrast       Other    Constipation    Relevant Orders    Celiac Disease Panel    Low serum alkaline phosphatase    Relevant Orders    Alkaline phosphatase, bone specific    Vitamin B6    Phosphorus    Magnesium    DXA bone density spine hip and pelvis    Vitamin D deficiency    Continue supplementations-we will check vitamin D 25-hydroxy         Relevant Orders    Comprehensive metabolic panel    Vitamin D 25 hydroxy    DXA bone density spine hip and pelvis      CC: Pituitary macroadenoma      History of Present Illness     HPI:  45-year-old female referred here for evaluation of pituitary macroadenoma-initially noted on imaging about 10 years back.  She had workup done in 2016 and it appears that adenoma was nonfunctioning-stable on imaging-last MRI in 2019  Recently saw her ophthalmologist  and was noted to have worsening Nasal field loss - worsening as per optho-not related to pituitary macroadenoma however was advised to follow-up with both neurology as well as endocrinology    No vision changes   Headaches - worsening for last year   Last saw neurology 5 years back     No changes in shoe or ring size   Weight stable   No muscle weakness      Has mirena IUD for 10 years - had irregular menstrual cycles since age 18 , skipping month   No galactorrhea     1 pregnancy - when she was 20    Night sweats /hot flashes for the past year  most nights a week       Hip fracture age [age]   Just walking and fell off a curb -was non weight bearing for a certain timeframe after that, did not require surgery   Was on forteo for a year at that time     No other low trauma fractures , no unsteady gait     Was an athlete , 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[age]  , on OCP age [age]-     Usually 2-3 BM/week       Review of Systems    Historical Information   Past Medical History:   Diagnosis Date    Allergic rhinitis     Chiari I malformation (HCC)     Migraine     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HCC)     Psychiatric disorder      Past Surgical History:   Procedure Laterality Date    KNEE SURGERY       Social History   Social History     Substance and Sexual Activity   Alcohol Use Yes    Alcohol/week: 6.0 standard drinks of alcohol    Types: 6 Standard drinks or equivalent per week    Comment: frequently     Social History     Substance and Sexual Activity   Drug Use No     Social History     Tobacco Use   Smoking Status Never   Smokeless Tobacco Never     Family History:   Family History   Problem Relation Age of Onset    Osteoporosis Mother     Coronary artery disease Mother     Coronary artery disease Father     No Known Problems Sister     Migraines Brother        Meds/Allergies   Current Outpatient Medications   Medication Sig Dispense Refill    amLODIPine (NORVASC) 5 mg tablet Take 1 tablet (5 mg total) by mouth daily 30 tablet 0    B-D ALLERGY  "SYRINGE 1CC/28G 28G X 1/2\" 1 ML MISC USE WEEKLY FOR THREE allergy injections AS DIRECTED  2    CALCIUM-VITAMIN D PO Take 1,500 mg by mouth daily      Cholecalciferol (VITAMIN D) 2000 units CAPS Take 2,000 Units by mouth daily        fluticasone (FLONASE) 50 mcg/act nasal spray 2 sprays into each nostril daily 16 g 0    levonorgestrel (MIRENA) 20 MCG/24HR IUD by Intrauterine route      Multiple Vitamin (MULTI-VITAMIN DAILY PO) Take by mouth daily       SUMAtriptan Succinate 6 MG/0.5ML SOCT Inject 6 mg under the skin every 2 (two) hours as needed       No current facility-administered medications for this visit.     Allergies   Allergen Reactions    Bee Pollen     Pollen Extract     Gentamicin Eye Swelling     Eye ointment only, tolerates flu shot with no problems.       Objective   Vitals: Blood pressure 120/80, pulse 70, height 6' (1.829 m), weight 73.9 kg (163 lb), SpO2 98%, not currently breastfeeding.    Physical Exam  Vitals reviewed.   Constitutional:       General: She is not in acute distress.     Appearance: Normal appearance. She is not ill-appearing, toxic-appearing or diaphoretic.   HENT:      Head: Normocephalic and atraumatic.   Eyes:      General: No scleral icterus.     Extraocular Movements: Extraocular movements intact.   Cardiovascular:      Rate and Rhythm: Normal rate and regular rhythm.      Heart sounds: Normal heart sounds. No murmur heard.  Pulmonary:      Effort: Pulmonary effort is normal. No respiratory distress.      Breath sounds: Normal breath sounds. No wheezing or rales.   Musculoskeletal:      Cervical back: Neck supple.      Right lower leg: No edema.      Left lower leg: No edema.   Lymphadenopathy:      Cervical: No cervical adenopathy.   Skin:     General: Skin is warm and dry.   Neurological:      General: No focal deficit present.      Mental Status: She is alert and oriented to person, place, and time.      Gait: Gait normal.   Psychiatric:         Mood and Affect: Mood " "normal.         Behavior: Behavior normal.         Thought Content: Thought content normal.         Judgment: Judgment normal.         The history was obtained from the review of the chart, patient.    Lab Results:              Imaging Studies:  Results for orders placed during the hospital encounter of 04/22/19    MRI brain pituitary wo and w contrast    Impression  Stable MR appearance of the brain.  12 x 8 x 8 mm hypoenhancing pituitary mass consistent with adenoma.    Workstation performed: [id number]         Results Review Statement: I reviewed radiology reports from this admission including: MRI brain.    Portions of the record may have been created with voice recognition software. Occasional wrong word or \"sound a like\" substitutions may have occurred due to the inherent limitations of voice recognition software. Read the chart carefully and recognize, using context, where substitutions have occurred.    "

## 2025-02-11 ENCOUNTER — APPOINTMENT (OUTPATIENT)
Dept: LAB | Facility: HOSPITAL | Age: 45
End: 2025-02-11
Payer: COMMERCIAL

## 2025-02-11 DIAGNOSIS — K59.00 CONSTIPATION, UNSPECIFIED CONSTIPATION TYPE: ICD-10-CM

## 2025-02-11 DIAGNOSIS — R74.8 LOW SERUM ALKALINE PHOSPHATASE: ICD-10-CM

## 2025-02-11 DIAGNOSIS — D35.2 PITUITARY ADENOMA (HCC): ICD-10-CM

## 2025-02-11 LAB
25(OH)D3 SERPL-MCNC: 24.5 NG/ML (ref 30–100)
ALBUMIN SERPL BCG-MCNC: 4.6 G/DL (ref 3.5–5)
ALP SERPL-CCNC: 17 U/L (ref 34–104)
ALT SERPL W P-5'-P-CCNC: 14 U/L (ref 7–52)
ANION GAP SERPL CALCULATED.3IONS-SCNC: 7 MMOL/L (ref 4–13)
AST SERPL W P-5'-P-CCNC: 15 U/L (ref 13–39)
BILIRUB SERPL-MCNC: 0.76 MG/DL (ref 0.2–1)
BUN SERPL-MCNC: 9 MG/DL (ref 5–25)
CALCIUM SERPL-MCNC: 9.1 MG/DL (ref 8.4–10.2)
CHLORIDE SERPL-SCNC: 100 MMOL/L (ref 96–108)
CO2 SERPL-SCNC: 29 MMOL/L (ref 21–32)
CORTIS AM PEAK SERPL-MCNC: 11.8 UG/DL (ref 6.7–22.6)
CREAT SERPL-MCNC: 0.79 MG/DL (ref 0.6–1.3)
GFR SERPL CREATININE-BSD FRML MDRD: 91 ML/MIN/1.73SQ M
GLUCOSE P FAST SERPL-MCNC: 92 MG/DL (ref 65–99)
IGA SERPL-MCNC: 252 MG/DL (ref 66–433)
MAGNESIUM SERPL-MCNC: 2 MG/DL (ref 1.9–2.7)
PHOSPHATE SERPL-MCNC: 3.1 MG/DL (ref 2.7–4.5)
POTASSIUM SERPL-SCNC: 3.8 MMOL/L (ref 3.5–5.3)
PROLACTIN SERPL-MCNC: 36.26 NG/ML (ref 3.34–26.72)
PROT SERPL-MCNC: 7.5 G/DL (ref 6.4–8.4)
SODIUM SERPL-SCNC: 136 MMOL/L (ref 135–147)
T4 FREE SERPL-MCNC: 0.75 NG/DL (ref 0.61–1.12)
TSH SERPL DL<=0.05 MIU/L-ACNC: 1.23 UIU/ML (ref 0.45–4.5)

## 2025-02-11 PROCEDURE — 83520 IMMUNOASSAY QUANT NOS NONAB: CPT

## 2025-02-11 PROCEDURE — 82784 ASSAY IGA/IGD/IGG/IGM EACH: CPT

## 2025-02-11 PROCEDURE — 84207 ASSAY OF VITAMIN B-6: CPT

## 2025-02-11 PROCEDURE — 84305 ASSAY OF SOMATOMEDIN: CPT

## 2025-02-11 PROCEDURE — 86364 TISS TRNSGLTMNASE EA IG CLAS: CPT

## 2025-02-12 LAB — ACTH PLAS-MCNC: 14.4 PG/ML (ref 7.2–63.3)

## 2025-02-13 LAB — IGF-I SERPL-MCNC: 156 NG/ML (ref 74–239)

## 2025-02-14 LAB — ALP BONE SERPL-MCNC: 4.2 UG/L

## 2025-02-15 LAB
TTG IGA SER IA-ACNC: 0.8 U/ML (ref ?–10)
VIT B6 SERPL-MCNC: 26.9 UG/L (ref 3.4–65.2)

## 2025-02-17 ENCOUNTER — RESULTS FOLLOW-UP (OUTPATIENT)
Dept: ENDOCRINOLOGY | Facility: CLINIC | Age: 45
End: 2025-02-17

## 2025-02-17 DIAGNOSIS — D32.9 MENINGIOMA (HCC): Primary | ICD-10-CM

## 2025-02-17 NOTE — RESULT ENCOUNTER NOTE
Vitamin D is low-prolactin is a little high  Start vitamin D3 2000 international units daily  Please have the pituitary MRI done prior to follow-up appointment-labs will be reviewed in detail on that

## 2025-02-19 ENCOUNTER — HOSPITAL ENCOUNTER (OUTPATIENT)
Dept: BONE DENSITY | Facility: IMAGING CENTER | Age: 45
Discharge: HOME | End: 2025-02-19
Payer: COMMERCIAL

## 2025-02-19 VITALS — BODY MASS INDEX: 23.02 KG/M2 | WEIGHT: 160.8 LBS | HEIGHT: 70 IN

## 2025-02-19 DIAGNOSIS — R74.8 LOW SERUM ALKALINE PHOSPHATASE: ICD-10-CM

## 2025-02-19 DIAGNOSIS — E55.9 VITAMIN D DEFICIENCY: ICD-10-CM

## 2025-02-19 DIAGNOSIS — Z87.81 HISTORY OF HIP FRACTURE: ICD-10-CM

## 2025-02-19 PROCEDURE — 77080 DXA BONE DENSITY AXIAL: CPT

## 2025-02-20 LAB — ALPHA SUBUNIT FREE SERPL-MCNC: 0.46 NG/ML

## 2025-02-21 ENCOUNTER — HOSPITAL ENCOUNTER (OUTPATIENT)
Dept: MRI IMAGING | Facility: HOSPITAL | Age: 45
End: 2025-02-21
Attending: INTERNAL MEDICINE
Payer: COMMERCIAL

## 2025-02-21 DIAGNOSIS — D35.2 PITUITARY ADENOMA (HCC): ICD-10-CM

## 2025-02-21 DIAGNOSIS — E23.6 PITUITARY MASS (HCC): ICD-10-CM

## 2025-02-21 DIAGNOSIS — G93.5 CHIARI I MALFORMATION (HCC): ICD-10-CM

## 2025-02-21 PROCEDURE — A9585 GADOBUTROL INJECTION: HCPCS | Performed by: INTERNAL MEDICINE

## 2025-02-21 PROCEDURE — 70553 MRI BRAIN STEM W/O & W/DYE: CPT

## 2025-02-21 RX ORDER — GADOBUTROL 604.72 MG/ML
7.5 INJECTION INTRAVENOUS
Status: COMPLETED | OUTPATIENT
Start: 2025-02-21 | End: 2025-02-21

## 2025-02-21 RX ADMIN — GADOBUTROL 7.5 ML: 604.72 INJECTION INTRAVENOUS at 17:20

## 2025-02-27 NOTE — RESULT ENCOUNTER NOTE
Please call the patient regarding MRI -pituitary adenoma is stable however there is possible small meningioma which has increased in size .  Is she is seeing neurosurgery?

## 2025-03-03 NOTE — RESULT ENCOUNTER NOTE
Please call the patient regarding labs -meningioma is a tumor that grows from the membranes that surround the brain -from the report it looks like it has been there since 2014 and has slowly grown over time.  I will place a referral for neurosurgery for evaluation to see if anything needs to be done for that versus monitoring

## 2025-03-03 NOTE — TELEPHONE ENCOUNTER
Called and informed patient.  Patient verbalized understanding.        ----- Message from Sujata Skaggs MD sent at 2/27/2025  9:22 AM EST -----  Please call the patient regarding MRI -pituitary adenoma is stable however there is possible small meningioma which has increased in size .  Is she is seeing neurosurgery?

## 2025-03-11 ENCOUNTER — CONSULT (OUTPATIENT)
Age: 45
End: 2025-03-11
Payer: COMMERCIAL

## 2025-03-11 VITALS
BODY MASS INDEX: 21.67 KG/M2 | WEIGHT: 160 LBS | HEART RATE: 90 BPM | DIASTOLIC BLOOD PRESSURE: 96 MMHG | OXYGEN SATURATION: 94 % | SYSTOLIC BLOOD PRESSURE: 136 MMHG | TEMPERATURE: 98.8 F | HEIGHT: 72 IN

## 2025-03-11 DIAGNOSIS — D32.9 MENINGIOMA (HCC): ICD-10-CM

## 2025-03-11 DIAGNOSIS — G43.909 MIGRAINE: ICD-10-CM

## 2025-03-11 DIAGNOSIS — G93.5 CHIARI I MALFORMATION (HCC): ICD-10-CM

## 2025-03-11 DIAGNOSIS — D35.2 PITUITARY ADENOMA (HCC): Primary | ICD-10-CM

## 2025-03-11 DIAGNOSIS — M54.12 RADICULOPATHY, CERVICAL: ICD-10-CM

## 2025-03-11 PROCEDURE — 99204 OFFICE O/P NEW MOD 45 MIN: CPT | Performed by: PHYSICIAN ASSISTANT

## 2025-03-11 RX ORDER — ASPIRIN 325 MG
TABLET ORAL
COMMUNITY
End: 2025-03-11 | Stop reason: SDUPTHER

## 2025-03-11 NOTE — PROGRESS NOTES
Name: Lesly Vergara      : 1980      MRN: [id number]  Encounter Provider: Noreen Richards PA-C  Encounter Date: 3/11/2025   [facility]  :  Assessment & Plan  Pituitary adenoma (HCC)  Known pituitary adenoma   Initially noted on MRI brain completed in  for evaluation of chronic migraines   Currently following with  endocrinology in this regard   Last seen in 2025   Repeat pituitary labs and updated MRI brain was completed at that time   Pt also follows with ophthalmology, last seen in 2024   Reported to have worsening nasal VF loss, not related to this pituitary adenoma   Plan is for 6 month follow-up - already scheduled   Today, pt offers no new complaints     Imagin/21 MRI brain pituitary w/wo: 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     Plan:   Pt encouraged to continue to monitor her neurological exam and sx, especially monitoring for peripheral vision loss and new/worsening HA   Imaging reviewed at length with the pt   MRI reveals stable, known pituitary adenoma without compression of the optic chiasm   Will plan for ongoing surveillance with repeat MRI brain pituitary with/without contrast in 1 year  Will plan to follow-up with the patient upon completion of this imaging  Appointment to be scheduled with an AP solo  Pt encouraged to continue to follow with her endocrinologist for yearly surveillance pituitary labs  Patient encouraged to continue to follow-up with her ophthalmologist for regular visual fields testing  Provided patient with our office fax number and encouraged her to fax over the repeat visual fields will be obtained at her next appointment in 6 months  Pt agreeable to the above noted plan   All questions answered at this time   Pt encouraged to call the office with any further questions or concerns or  "should they experience any worsening sx    Orders:    MRI brain pituitary wo and w contrast; Future    Chiari I malformation (HCC)  Known chiari I malformation   Noted incidentally on MRI completed for workup of migraine HA   Reports stable/improved HA  Denies any new HA, weakness, balance trouble, falls, urinary retention, etc.     Plan:   Referral placed to neurology for ongoing HA management     Meningioma (HCC)  Pt presents to the  nsgy office today as a new for pt for evaluation of a small meningioma.   Noted incidentally on MRI brain pituitary completed for ongoing surveillance of a pituitary adenoma by her endocrinologist   Denies any HA, dizziness, new vision changes, speech difficulty, AMS, weakness, seizure like activity, LOC, etc.     Imagin/21 MRI brain pituitary w/wo: 0.5 cm probable meningioma along right inferolateral frontal convexity, retrospectively slowly growing over time since 2014. 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 Chiari I malformation, unchanged. No acute intracranial abnormality.    Plan:   Pt encouraged to continue to closely monitor her neurological exam and sx   Pt educated on \" red flag\" s/sx to monitor for including headache, vision changes, dizziness, AMS, speech difficulty, weakness, N/T, seizure like activity, LOC, etc.   Imaging reviewed at length with the pt   MRI reveals a tiny R frontal, likely meningioma without any surrounding edema, mass effect, or midline shift.   Upon review of previous imaging, this was present of past MRI brain and has very slightly increased in size over the last over the last 6 years since 2019   Discussed with the pt the diagnosis of and natural hx of a meningioma.  Recommend ongoing surveillance with repeat MRI brain w/wo in 1 year   Will plan to follow-up with the pt again upon completion of this imaging   Appt to be scheduled with an AP " solo   Pt agreeable to the above noted plan   All questions answered at this time   Pt encouraged to call the office with any further questions or concerns or should they experience any worsening sx      Orders:    Ambulatory Referral to Neurosurgery    MRI brain pituitary wo and w contrast; Future    Migraine  Long time hx of migraine QU[place]NA   Began as a teenager   Ofter triggered by hormonal changes in her menstrual cycle   Taking tylenol and Imitrex as needed     Plan:   Referral placed to  neurology     Orders:    Ambulatory Referral to Neurology; Future    Radiculopathy, cervical  Reports chronic neck pain w/ RUE radiculopathy   Admits to shooting pain and N/T intermittently into the R arm down to her thumb and first finger   Denies any weakness   No recent PT   No follow-up with PM   No prior cervical surgeries     Plan:   Referral placed to PT     Orders:    Ambulatory Referral to Physical Therapy; Future      History of Present Illness     HPI   Lesly Vergara is a 45 y.o. F with a PMH significant for chronic migraines who presented to the [facility] neurosurgery office today as a new patient for evaluation of an incidentally noted small right frontal meningioma as well as to establish care in regards to a known pituitary adenoma and Chiari I malformation.  Patient reports she has had a longtime history of migraine headaches that began as a teenager.  She does report these headaches seem to be associated with hormonal changes during her menstrual cycle.  Patient has been intermittently following with a neurologist and has trialed different medications over the last 20 to 30 years.  Patient states overall her headaches have been stable with as needed Tylenol and Imitrex.  Patient did undergo an MRI in 2014 for further evaluation of these migraine headaches and was noted to have an incidental pituitary adenoma.  She has been intermittently following with endocrinology for regular pituitary labs and  surveillance imaging over the last 11 years.  Patient recently saw endocrinology in January 2025 and underwent updated MRI as well as pituitary labs.  New MRI revealed stable pituitary adenoma and Chiari I malformation, but did report a slight increase in a right frontal meningioma.  Patient was referred to the [facility] neurosurgery office for further evaluation of this noted meningioma.    Patient states that her headaches have been stable and unchanged/slightly better over the last year.  She denies any dizziness, new vision changes, weakness, numbness, bowel/bladder incontinence, balance difficulty, urinary retention, seizure-like activity, altered mental status, LOC, etc.  Patient does report she has some chronic vision changes and wears glasses at baseline.  She does follow with ophthalmology and her most recent visual field testing she was noted to have a loss of a medial/nasal field for which she is following closely with plan for repeat study/visual field in about 6 months.  Patient offers no new complaints today.  She does express some anxiety about the above noted findings.  Upon questioning, the patient does admit to some chronic neck pain with intermittent radicular pain down the right upper extremity with associated numbness and tingling in her thumb and first 2 fingers.  Patient denies any recent physical therapy, previous follow-up with pain management, or previous neck surgeries.         Review of Systems   Constitutional:  Positive for fatigue.   Eyes:  Positive for photophobia (with migraines). Negative for visual disturbance.   Respiratory:  Negative for shortness of breath.    Cardiovascular:  Negative for chest pain.   Musculoskeletal:  Positive for myalgias and neck pain.   Neurological:  Positive for numbness (N&T in R arm into fingers, intermittent) and headaches (migraines). Negative for weakness.        Pain in right hand   Hematological:  Bruises/bleeds easily.   Psychiatric/Behavioral:   "Negative for agitation, behavioral problems, confusion and decreased concentration.      I have personally reviewed the MA's review of systems and made changes as necessary.    Past Medical History   Past Medical History:   Diagnosis Date    Allergic rhinitis     Chiari I malformation (HCC)     Migraine     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HCC)     Psychiatric disorder      Past Surgical History:   Procedure Laterality Date    KNEE SURGERY       Family History   Problem Relation Age of Onset    Osteoporosis Mother     Coronary artery disease Mother     Coronary artery disease Father     No Known Problems Sister     Migraines Brother      she reports that she has never smoked. She has never used smokeless tobacco. She reports current alcohol use of about 6.0 standard drinks of alcohol per week. She reports that she does not use drugs.  Current Outpatient Medications   Medication Instructions    amLODIPine (NORVASC) 5 mg, Oral, Daily    B-D ALLERGY SYRINGE 1CC/28G 28G X 1/2\" 1 ML MISC USE WEEKLY FOR THREE allergy injections AS DIRECTED    CALCIUM-VITAMIN D PO 1,500 mg, Daily    fluticasone (FLONASE) 50 mcg/act nasal spray 2 sprays, Nasal, Daily    levonorgestrel (MIRENA) 20 MCG/24HR IUD by Intrauterine route    Multiple Vitamin (MULTI-VITAMIN DAILY PO) Daily    SUMAtriptan Succinate 6 mg, Every 2 hour PRN    Vitamin D 2,000 Units, Daily     Allergies   Allergen Reactions    Bee Pollen     Pollen Extract     Gentamicin Eye Swelling     Eye ointment only, tolerates flu shot with no problems.      Objective   /96 (BP Location: Left arm, Patient Position: Sitting, Cuff Size: Standard)   Pulse 90   Temp 98.8 °F (37.1 °C) (Temporal)   Ht 6' (1.829 m)   Wt 72.6 kg (160 lb)   SpO2 94%   BMI 21.70 kg/m²     Physical Exam  Neurological Exam  General appearance: alert, appears stated age, cooperative and no distress  Head: Normocephalic, without obvious abnormality, atraumatic  Eyes: EOMI, PERRL  Neck: supple, symmetrical, trachea " midline and NT  Back: no kyphosis present, no tenderness to percussion or palpation  Lungs: non labored breathing, no resp distress on room air   Heart: regular heart rate  Neurologic:   Mental status: Alert, oriented x3, thought content appropriate  Cranial nerves: grossly intact (Cranial nerves II-XII)  Sensory: normal to LT leela throughout   - some reported paraesthesias to the R thumb/first finger  Motor: moving all extremities without focal weakness   - strength 5/5 throughout leela UE and leela LE   Reflexes: 2+ and symmetric  - no blackmon's or clonus noted leela   Coordination: finger to nose normal bilaterally, no drift bilaterally      Radiology Results Review: I personally reviewed the following image studies in PACS and associated radiology reports: MRI brain. My interpretation of the radiology images/reports is: see above.    Administrative Statements   I have spent a total time of 45 minutes in caring for this patient on the day of the visit/encounter including Diagnostic results, Prognosis, Risks and benefits of tx options, Instructions for management, Patient and family education, Importance of tx compliance, Risk factor reductions, Impressions, Counseling / Coordination of care, Documenting in the medical record, Reviewing/placing orders in the medical record (including tests, medications, and/or procedures), and Obtaining or reviewing history  .

## 2025-03-11 NOTE — ASSESSMENT & PLAN NOTE
Known chiari I malformation   Noted incidentally on MRI completed for workup of migraine HA   Reports stable/improved HA  Denies any new HA, weakness, balance trouble, falls, urinary retention, etc.     Plan:   Referral placed to neurology for ongoing HA management

## 2025-03-11 NOTE — ASSESSMENT & PLAN NOTE
"Pt presents to the  nsgy office today as a new for pt for evaluation of a small meningioma.   Noted incidentally on MRI brain pituitary completed for ongoing surveillance of a pituitary adenoma by her endocrinologist   Denies any HA, dizziness, new vision changes, speech difficulty, AMS, weakness, seizure like activity, LOC, etc.     Imagin/21 MRI brain pituitary w/wo: 0.5 cm probable meningioma along right inferolateral frontal convexity, retrospectively slowly growing over time since 2014. 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 Chiari I malformation, unchanged. No acute intracranial abnormality.    Plan:   Pt encouraged to continue to closely monitor her neurological exam and sx   Pt educated on \" red flag\" s/sx to monitor for including headache, vision changes, dizziness, AMS, speech difficulty, weakness, N/T, seizure like activity, LOC, etc.   Imaging reviewed at length with the pt   MRI reveals a tiny R frontal, likely meningioma without any surrounding edema, mass effect, or midline shift.   Upon review of previous imaging, this was present of past MRI brain and has very slightly increased in size over the last over the last 6 years since 2019   Discussed with the pt the diagnosis of and natural hx of a meningioma.  Recommend ongoing surveillance with repeat MRI brain w/wo in 1 year   Will plan to follow-up with the pt again upon completion of this imaging   Appt to be scheduled with an AP solo   Pt agreeable to the above noted plan   All questions answered at this time   Pt encouraged to call the office with any further questions or concerns or should they experience any worsening sx      Orders:    Ambulatory Referral to Neurosurgery    MRI brain pituitary wo and w contrast; Future    "

## 2025-03-11 NOTE — ASSESSMENT & PLAN NOTE
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     Plan:   Pt encouraged to continue to monitor her neurological exam and sx, especially monitoring for peripheral vision loss and new/worsening HA   Imaging reviewed at length with the pt   MRI reveals stable, known pituitary adenoma without compression of the optic chiasm   Will plan for ongoing surveillance with repeat MRI brain pituitary with/without contrast in 1 year  Will plan to follow-up with the patient upon completion of this imaging  Appointment to be scheduled with an AP solo  Pt encouraged to continue to follow with her endocrinologist for yearly surveillance pituitary labs  Patient encouraged to continue to follow-up with her ophthalmologist for regular visual fields testing  Provided patient with our office fax number and encouraged her to fax over the repeat visual fields will be obtained at her next appointment in 6 months  Pt agreeable to the above noted plan   All questions answered at this time   Pt encouraged to call the office with any further questions or concerns or should they experience any worsening sx    Orders:    MRI brain pituitary wo and w contrast; Future

## 2025-04-08 ENCOUNTER — OFFICE VISIT (OUTPATIENT)
Dept: ENDOCRINOLOGY | Facility: CLINIC | Age: 45
End: 2025-04-08
Payer: COMMERCIAL

## 2025-04-08 VITALS
WEIGHT: 161.2 LBS | BODY MASS INDEX: 21.83 KG/M2 | DIASTOLIC BLOOD PRESSURE: 98 MMHG | HEART RATE: 78 BPM | HEIGHT: 72 IN | SYSTOLIC BLOOD PRESSURE: 130 MMHG

## 2025-04-08 DIAGNOSIS — D35.2 PITUITARY ADENOMA (HCC): Primary | ICD-10-CM

## 2025-04-08 DIAGNOSIS — M81.0 OSTEOPOROSIS, UNSPECIFIED OSTEOPOROSIS TYPE, UNSPECIFIED PATHOLOGICAL FRACTURE PRESENCE: ICD-10-CM

## 2025-04-08 PROCEDURE — 99214 OFFICE O/P EST MOD 30 MIN: CPT | Performed by: PHYSICIAN ASSISTANT

## 2025-04-08 NOTE — ASSESSMENT & PLAN NOTE
Anterior pituitary hormonal panel stable with mildly elevated prolactin (36.26)  Continue to monitor labs q6 months  MRI brain stable - monitor annually with Neurosurgery  Meningioma noted - Patient now following with Neurosurgery  Orders:  •  Prolactin; Future  •  FSH and LH; Future  •  IGF-1; Future  •  ACTH; Future  •  Testosterone, free, total; Future  •  TSH, 3rd generation with Free T4 reflex; Future  •  Cortisol Level,7-9 AM Specimen; Future

## 2025-04-08 NOTE — PATIENT INSTRUCTIONS
Continue Vitamin D and Calcium supplementation  Ensure you are getting at least 1200mg total calcium daily  Maintain physical activity with weight-bearing exercise  Obtain labs prior to next appointment  Obtain PEA (phosphoethanolamine) at [facility]

## 2025-04-08 NOTE — ASSESSMENT & PLAN NOTE
DEXA 2/2025 stable from prior, repeat in 2027  Alk Phos: 17, in 2022 was 20  B6: 26.9   Multiple fractures notes before age 20  Contacted [facility] rep to advise on further testing  Pursue PEA at [facility]  Orders:  •  Comprehensive metabolic panel; Future

## 2025-04-08 NOTE — PROGRESS NOTES
Name: Lesly Vergara      : 1980      MRN: [id number]  Encounter Provider: Rianna Peres PA-C  Encounter Date: 2025   Encounter department: [facility] [place]    Chief Complaint   Patient presents with   • Pituitary Problem     :  Assessment & Plan  Pituitary adenoma (HCC)  Anterior pituitary hormonal panel stable with mildly elevated prolactin (36.26)  Continue to monitor labs q6 months  MRI brain stable - monitor annually with Neurosurgery  Meningioma noted - Patient now following with Neurosurgery  Orders:  •  Prolactin; Future  •  FSH and LH; Future  •  IGF-1; Future  •  ACTH; Future  •  Testosterone, free, total; Future  •  TSH, 3rd generation with Free T4 reflex; Future  •  Cortisol Level,7-9 AM Specimen; Future    Osteoporosis, unspecified osteoporosis type, unspecified pathological fracture presence  DEXA 2025 stable from prior, repeat in   Alk Phos: 17, in  was 20  B6: 26.9   Multiple fractures notes before age 20  Contacted P rep to advise on further testing  Pursue [facility] at [facility]  Orders:  •  Comprehensive metabolic panel; Future        History of Present Illness {?Quick Links Encounters * My Last Note * Last Note in Specialty * Snapshot * Since Last Visit * History :07937}  History of Present Illness  Lesly Vergara is a 45-year-old female here for follow-up of her pituitary macroadenoma as well as osteoporosis.    She has been under the care of neurosurgery and is scheduled for a repeat MRI. She reports no changes in her vision or the occurrence of headaches, attributing any such symptoms to seasonal allergies. She is also due for a visual field test with her ophthalmologist. She has an upcoming appointment with neurology next month for the management of her migraines. Her typical headache pattern includes ocular symptoms, tinnitus, and sweating.    She was previously on Forteo for a year following a hip fracture. She continues to  take vitamin D and calcium supplements, with a daily intake of 1200 mg of calcium. She has no history of weak or brittle bones, bone deformities including the skull and chest, both legs or knock knees, bone pain, osteomalacia, rickets, respiratory difficulties, early tooth loss with the root intact, poor dental health or cavities in her youth, muscle weakness or low muscle tone, muscle or joint pain, difficulty walking or waddling gait, seizures, delayed growth or weight gain in her youth, failure to thrive, missed developmental milestones or delays, short stature, or need for mobility assistance. She has a history of several fractures in her childhood, most of which occurred before she turned 20. She recalls fracturing her foot at the age of 19 and her thumb in her early teens, as well as numerous fingers    Supplemental Information  She has Chiari malformation.    FAMILY HISTORY  Her mother has osteoporosis, which presented in her late 50s.    MEDICATIONS  Current: Vitamin D, calcium.  Past: Forteo.      Pertinent Medical History   Patient with a pituitary macroadenoma initially noted in 2015, incidentally found during workup for migraine.    History of hip fracture age [age], she was walking and fell off of a curb was nonweightbearing but did not require surgery.  She was maintained on Forteo for 1 year following.  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      Review of Systems as per HPI       Medical History Reviewed by provider this encounter:     .    Objective {?Quick Links Trend Vitals * Enter New Vitals * Results Review * Timeline (Adult) * Labs * Imaging * Cardiology * Procedures * Lung Cancer Screening * Surgical eConsent :[id number]}  /98 (BP Location: Left arm, Patient Position: Sitting, Cuff Size: Adult)   Pulse 78   Ht 6' (1.829 m)   Wt 73.1 kg (161 lb 3.2 oz)   BMI 21.86 kg/m²      Body mass index is 21.86 kg/m².  Wt Readings from Last 3 Encounters:   04/08/25 73.1 kg (161 lb  3.2 oz)   03/11/25 72.6 kg (160 lb)   02/19/25 72.9 kg (160 lb 12.8 oz)     Physical Exam  Physical Exam      Results  Laboratory Studies  Prolactin was mildly elevated.    Imaging  DEXA scan showed stable results.  Labs:   Lab Results   Component Value Date    HGBA1C 5.0 09/30/2024     Lab Results   Component Value Date    CREATININE 0.79 02/11/2025    CREATININE 0.96 03/29/2022    BUN 9 02/11/2025    K 3.8 02/11/2025     02/11/2025    CO2 29 02/11/2025     eGFR   Date Value Ref Range Status   02/11/2025 91 ml/min/1.73sq m Final     Lab Results   Component Value Date    HDL 72 09/30/2024    TRIG 76 09/30/2024     Lab Results   Component Value Date    ALT 14 02/11/2025    AST 15 02/11/2025    ALKPHOS 17 (L) 02/11/2025     Lab Results   Component Value Date    ONR5ZJQCMOBR 1.226 02/11/2025     Lab Results   Component Value Date    FREET4 0.75 02/11/2025       Patient Instructions   Continue Vitamin D and Calcium supplementation  Ensure you are getting at least 1200mg total calcium daily  Maintain physical activity with weight-bearing exercise  Obtain labs prior to next appointment  Obtain PEA (phosphoethanolamine) at [facility]      Discussed with the patient and all questioned fully answered. She will call me if any problems arise.

## 2025-04-14 ENCOUNTER — TELEPHONE (OUTPATIENT)
Dept: ENDOCRINOLOGY | Facility: CLINIC | Age: 45
End: 2025-04-14

## 2025-04-14 NOTE — TELEPHONE ENCOUNTER
Received request to have provider orders and last office note fax to [facility]   Strensiq for treatment of Hypophosphatasia   Fax # [phone]      Called PT to advise of mailing out the script and PT services enrollment forms   [facility] miscellaneous lab for the testing for PEA  test tub   Faxing of the order to [facility]   Speaking with provider about the Genetic testing

## 2025-04-21 ENCOUNTER — TELEPHONE (OUTPATIENT)
Dept: ENDOCRINOLOGY | Facility: CLINIC | Age: 45
End: 2025-04-21

## 2025-04-21 NOTE — TELEPHONE ENCOUNTER
Patient informed and she will print out lab test and have it done.       ----- Message from Rianna Peres PA-C sent at 4/10/2025 11:29 AM EDT -----  Please notify the patient that a lab has been requested through [facility]. If diagnosis is confirmed I have a [employer] patient services enrollment form with a phone number that may benefit her as well as an educational session hosted by [employer].  Paperwork is in my office, if we could get this information to the patient once she completes her testing/if diagnosis is confirmed.

## 2025-04-21 NOTE — TELEPHONE ENCOUNTER
Odette Galdamez called asking if the office received the forms they sent for Strensiq prior authorization. Nothing in media and no notes. Confirmed fax number and they will fax again to the office.  ABRAHAM

## 2025-04-22 ENCOUNTER — TELEPHONE (OUTPATIENT)
Dept: NEUROLOGY | Facility: CLINIC | Age: 45
End: 2025-04-22

## 2025-04-22 DIAGNOSIS — M81.0 OSTEOPOROSIS, UNSPECIFIED OSTEOPOROSIS TYPE, UNSPECIFIED PATHOLOGICAL FRACTURE PRESENCE: ICD-10-CM

## 2025-04-22 DIAGNOSIS — R74.8 LOW SERUM ALKALINE PHOSPHATASE: Primary | ICD-10-CM

## 2025-04-22 NOTE — TELEPHONE ENCOUNTER
[facility]   
The patient is a 53y Male complaining of

## 2025-05-01 ENCOUNTER — OFFICE VISIT (OUTPATIENT)
Dept: NEUROLOGY | Facility: CLINIC | Age: 45
End: 2025-05-01
Payer: COMMERCIAL

## 2025-05-01 VITALS
BODY MASS INDEX: 21.81 KG/M2 | TEMPERATURE: 98.2 F | DIASTOLIC BLOOD PRESSURE: 114 MMHG | SYSTOLIC BLOOD PRESSURE: 156 MMHG | HEART RATE: 72 BPM | WEIGHT: 161 LBS | HEIGHT: 72 IN

## 2025-05-01 DIAGNOSIS — E23.6 PITUITARY MASS (HCC): ICD-10-CM

## 2025-05-01 DIAGNOSIS — G43.909 MIGRAINE: ICD-10-CM

## 2025-05-01 DIAGNOSIS — G43.E09 CHRONIC MIGRAINE WITH AURA WITHOUT STATUS MIGRAINOSUS, NOT INTRACTABLE: Primary | ICD-10-CM

## 2025-05-01 DIAGNOSIS — G93.2 INTRACRANIAL HYPERTENSION: ICD-10-CM

## 2025-05-01 DIAGNOSIS — G43.709 CHRONIC MIGRAINE WITHOUT AURA WITHOUT STATUS MIGRAINOSUS, NOT INTRACTABLE: ICD-10-CM

## 2025-05-01 DIAGNOSIS — E53.8 B12 DEFICIENCY: ICD-10-CM

## 2025-05-01 DIAGNOSIS — I73.00 RAYNAUD'S PHENOMENON WITHOUT GANGRENE: ICD-10-CM

## 2025-05-01 PROCEDURE — 99205 OFFICE O/P NEW HI 60 MIN: CPT | Performed by: PHYSICIAN ASSISTANT

## 2025-05-01 RX ORDER — ATOGEPANT 60 MG/1
60 TABLET ORAL DAILY
Qty: 30 TABLET | Refills: 11 | Status: SHIPPED | OUTPATIENT
Start: 2025-05-01

## 2025-05-01 RX ORDER — PROCHLORPERAZINE MALEATE 10 MG
10 TABLET ORAL EVERY 6 HOURS PRN
Qty: 10 TABLET | Refills: 3 | Status: SHIPPED | OUTPATIENT
Start: 2025-05-01

## 2025-05-01 RX ORDER — RIMEGEPANT SULFATE 75 MG/75MG
75 TABLET, ORALLY DISINTEGRATING ORAL DAILY PRN
Qty: 16 TABLET | Refills: 6 | Status: SHIPPED | OUTPATIENT
Start: 2025-05-01

## 2025-05-01 RX ORDER — CELECOXIB 100 MG/1
100 CAPSULE ORAL EVERY 12 HOURS PRN
Qty: 20 CAPSULE | Refills: 1 | Status: SHIPPED | OUTPATIENT
Start: 2025-05-01

## 2025-05-01 NOTE — PROGRESS NOTES
Name: Lesly Vergara      : 1980      MRN: [id number]  Encounter Provider: Dania Falcon PA-C  Encounter Date: 2025   Encounter department: [facility]OLOGY [facility]  :  Assessment & Plan  Chronic migraine with aura without status migrainosus, not intractable  Preventative:  B2 400 mg a day  Magnesium 250-400 mg a day  Continue other medications  Start Qulipta 60 mg in the am.  If makes too tired change to bedtime    Abortive:  At onset of aura or migraine, take Nurtec 75 mg.  If severe or not going away add celebrex 100 mg and prochlorperazine 10 mg.  May repeat celebrex in 12 hours and prochlorperazine in 8 hours  If needed may also use and/or start with your sumatriptan injection      In addition we will get blood work and EKG.  EKG is just for baseline to evaluate for any cardiac conditions or concerns       Chronic migraine without aura without status migrainosus, not intractable  Preventative:  B2 400 mg a day  Magnesium 250-400 mg a day  Continue other medications  Start Qulipta 60 mg in the am.  If makes too tired change to bedtime    Abortive:  At onset of aura or migraine, take Nurtec 75 mg.  If severe or not going away add celebrex 100 mg and prochlorperazine 10 mg.  May repeat celebrex in 12 hours and prochlorperazine in 8 hours  If needed may also use and/or start with your sumatriptan injection  Orders:  •  ECG 12 lead; Future  •  Atogepant (Qulipta) 60 MG TABS; Take 60 mg by mouth in the morning If makes drowsy after a few days of starting, changed to bedtime  •  celecoxib (CeleBREX) 100 mg capsule; Take 1 capsule (100 mg total) by mouth every 12 (twelve) hours as needed for mild pain  •  prochlorperazine (COMPAZINE) 10 mg tablet; Take 1 tablet (10 mg total) by mouth every 6 (six) hours as needed for nausea    Intracranial hypertension  After reviewing MRI with patient 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  We did discuss  that we will attempt to minimize and treat after we regulate her migraines which are more life altering and disabling.  Her constant daily headache is less rehabilitating at this time.       Raynaud's phenomenon without gangrene  Patient is currently on amlodipine 5 mg  We did discuss that CGRP medication does have an added risk now of worsening Raynaud's.  Patient did understand this as we discussed       Pituitary mass (HCC)  Continue to follow closely with endocrinology and neurosurgery       Migraine    Orders:  •  ECG 12 lead; Future  •  Ambulatory Referral to Neurology  •  rimegepant sulfate (Nurtec) 75 mg TBDP; Take 1 tablet (75 mg total) by mouth daily as needed (migraine)  •  SUMAtriptan Succinate 6 MG/0.5ML SOCT; Inject 0.5 mL (6 mg total) under the skin every 2 (two) hours as needed (migrane) Max of 2 in 24 hours    B12 deficiency  Will replete if necessary  Orders:  •  Vitamin B12; Future          History of Present Illness   HPI   Lesly Vergara is a 45 y.o. female with venous insufficiency allergic rhinitis, pituitary adenoma, Chiari I malformation, meningioma, osteoporosis, chondromalacia, dry eyes, sleep disturbance, snoring, Raynaud's phenomenon, vitamin D deficiency and constipation, who presents to Neurology office for  her headaches.   Works as at [employer].    Patient had seen [facility] from [id number].  Started having migraines when she was 15 years old.  When 18 years old her headaches became more frequent and lasted all day.  She did see a neurologist who tried her on multiple preventative medications.  She gave up when she was 24.,Had sleep study which was negative and neuropsych testing which recommended making certain behavioral changes in support of better sleep and exercise.     QTc none will order EKG  History Tobacco use:  [x] No   [] Yes   [] Currently smoking [] Quit     Psychiatric History:  Depression: no  Anxiety: no    Family history:  Migraines:   [] No   [x] Yes   mother, father, sister, brother  Aneurysms: [x] No   [] Yes      Headaches began at what age? 15 and worse at 18    What medications do you take or have you taken for your headaches?   Current Preventive:   Amlodipine  Calcium, vitamin D, multivitamin  Current Abortive:   Sumatriptan injection    Prior Preventive:   Botox  Venlafaxine Paxil, Zoloft, Pamelor (sleepy) Wellbutrin  Atenolol  Depakote (tired and sleepy), gabapentin (did not work), Topamax (did not work),. Zonisamide    Prior Abortive:   Ketorolac  Metoclopramide  Naproxen, Ibuprofen, Excedrin Migraine  Prednisone  Cambia  Prochlorperazine    What is your current pain level ?  1-2/10  How often do the headaches occur?   Mild: continuous at a 1-3/10 (for as long as she can remember)  Moderate/severe: 1-2 times a week  Are you ever headache free? no    Aura/warning ? Yes [x]Blurry vision, []Upset stomach, []Neck pain, []Light headed, []Blind spot, []Loss of vision Dizziness, []Lethargy, []Flashing light, []weakness/numbness[]Mood swings, []Scotomas  when does it start?  30 minutes prior how long does it last  continuous    What time of the day do the headaches start?   varies    How long do the headaches last?   Mild: daily continuous  Moderate/severe a few hours to 1 day    Describe your usual headache ?  Mild: dull aching annoy   Throbbing, Pounding, Sharp, Shooting, Stabbing, Electric, Burning, Nagging, Aching, Dull, Tight band, and pressure and vice     Where is your headache located?   Mild behind ears to cervical paraspinalis  frontalis, retro-orbital, temporalis, neck, shoulder, and sinus area    What is the intensity of pain?   Mild 1-3/10  Moderate/severe: 3-9/10, average 5/10, gets to a 9/10-10/10 1-2 times a month    Associated symptoms:   [x] Photophobia   [x]Phonophobia  [x] Osmophobia  [x]Nausea   [x] Vomiting   [] Diarrhea  [x] Stiff or sore neck   [x] Problems with concentration  [x] Blurred vision [] loss of vision  [] change in pupil  size  [] Ptosis    [] Facial droop  [] red ear  [] Lacrimation  [] Nasal congestion/rhinorrhea  [x] Light-headed or dizzy     [x] Tinnitus   [x] Hands or feet tingle or feel numb/paresthesias    [x] Prefer quiet, dark room    Number of days missed per month because of headaches:  Work (or school) days: misses 1 a month (a few hours)  Social or Family activities: Rarely    Alternative therapies used in the past for headaches? [x] Massage   [] physical therapy   [] chiropractor [] acupuncture [] acupressure   [] CBT  [] Biofeedback  [] other  Headache are worse if the patient: [x]cough,[x] sneeze,[x] bending over,    [] Exertion  Headache triggers:  none    What time of the year do headaches occur more frequently? are usually worse in the fall and spring  Have you seen someone else for headaches or pain? Yes, [facility] and another  Have you had trigger point injection performed and how often? No  Have you had Botox injection performed and how often? Yes, did 3 rounds   Have you had epidural injections or transforaminal injections performed? No,     Have you used CBD or THC for your headaches and how often? No    Are you current pregnant or planning on getting pregnant? No, IUD    Pertinent labs:  Please see EMR for all labs  2/11/2025  CMP essentially normal with the exception of lower alk phos, vitamin D 24.5, vitamin B6 26.9 normal TSH and T4    Have you ever had any Brain imaging? yes   Multiple, most current:  2/11/2025 MRI brain with pituitary  0.5 cm probable meningioma along right inferolateral frontal convexity, retrospectively slowly growing over time since 8/27/2014. Recommend evaluation by neurosurgery.     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     Chiari I malformation, unchanged.     No acute intracranial abnormality.  Reviewed with patient during visit on 5/1/2025 and discussed with patient she does have Chiari I  malformation with cerebellar medullary compression     I personally reviewed these images.  Recent laboratory data was reviewed.  Medications and allergies were reviewed.    3/11/2025 neurosurgery consult:  Initially noted on 2014.  Following with endocrinology.  Last saw ophthalmology in November 2020 for reported to have worsening nasal NF loss but not related to pituitary adenoma plan is to continue to monitor her for neurologic exam and symptoms with peripheral vision and new worsening headache.    Sleep study:  Yes, home sleep    10/16/2017  Overall AHI = 2.0 events/hour.  Supine AHI = 3.6 events/hour.  Nonsupine AHI = 1.3 events/hour.  Dexter oximetry = 91%.    Reviewed old notes from physician seen in the past- see above HPI for summary of previous encounters.        I reviewed her chart, as documented in Epic/[employer], and summarized above.       Review of Systems   Constitutional:  Negative for appetite change and fever.   HENT:  Positive for tinnitus. Negative for hearing loss, trouble swallowing and voice change.         Phonophobia   Eyes:  Positive for photophobia. Negative for pain.   Respiratory: Negative.  Negative for shortness of breath.    Cardiovascular: Negative.  Negative for palpitations.   Gastrointestinal:  Positive for nausea and vomiting.   Endocrine: Negative.  Negative for cold intolerance.   Genitourinary: Negative.  Negative for dysuria, frequency and urgency.   Musculoskeletal: Negative.  Negative for myalgias and neck pain.   Skin: Negative.  Negative for rash.   Neurological:  Positive for headaches (daily headache, 1-2 migraines a month). Negative for dizziness, tremors, seizures, syncope, facial asymmetry, speech difficulty, weakness, light-headedness and numbness.   Hematological: Negative.  Does not bruise/bleed easily.   Psychiatric/Behavioral:  Positive for sleep disturbance. Negative for confusion and hallucinations.    I have personally reviewed the MA's review of  systems and made changes as necessary.         Objective   BP (!) 156/114 (BP Location: Right arm, Patient Position: Sitting, Cuff Size: Standard)   Pulse 72   Temp 98.2 °F (36.8 °C) (Temporal)   Ht 6' (1.829 m)   Wt 73 kg (161 lb)   BMI 21.84 kg/m²     Physical Exam  Neurological Exam        CONSTITUTIONAL: Well developed, well nourished, well groomed. No dysmorphic features.     Eyes:  PERRLA, EOM normal      Neck:  Normal ROM, neck supple.      HEENT:  Normocephalic atraumatic.    Chest:  Respirations regular and unlabored.    Cardiovascular:  Distal extremities warm  no observed significant swelling.    Musculoskeletal:  Full range of motion.  (see below under neurologic exam for evaluation of motor function and gait)   Skin:  warm and dry   Psychiatric:  Normal behavior and appropriate affect      SKULL AND SPINE  ROM: Full range of motion  Tenderness: No focal tenderness  Paravertebral Muscles: normal    MENTAL STATUS  Orientation: Alert and oriented x 3  Fund of knowledge: Intact.    CRANIAL NERVES  II: PERRL.  III, IV, VI: Extraocular movements intact.  No nystagmus.  V: Facial sensation normal V1-V3  VII: Facial movements normal and symmetric  VIII: Intact hearing bilaterally  IX, X: Palate elevation normal and symmetric  XI: Intact trapezius, SCM strength  XII: Tongue protrudes to the midline    MOTOR (Upper and lower extremities)   Bulk/tone/abnormal movement: Normal muscle bulk and tone.  Drift: No pronator drift.  Strength: Strength 5/5 throughout.      COORDINATION   F/N: normal  ROYCE: normal  Station/Gait: Normal baseline gait.      SENSORY  Temperature: normal  Vibration: normal  Pinprick: normal  Light touch: normal  Romberg sign absent.    Reflexes:  deep tendon reflexes are 4+/4 throughout        Administrative Statements   I have spent a total time of 82 minutes in caring for this patient on the day of the visit/encounter including Diagnostic results, Prognosis, Risks and benefits of tx  options, Instructions for management, Patient and family education, Impressions, Counseling / Coordination of care, Documenting in the medical record, Reviewing/placing orders in the medical record (including tests, medications, and/or procedures), and Obtaining or reviewing history  .

## 2025-05-01 NOTE — PROGRESS NOTES
Name: Lesly Vergara      : 1980      MRN: [id number]  Encounter Provider: Dania Falcon PA-C  Encounter Date: 2025   [facility] [place] [facility]  :  Assessment & Plan  Chronic migraine with aura without status migrainosus, not intractable         Chronic migraine without aura without status migrainosus, not intractable    Orders:  •  ECG 12 lead; Future  •  Atogepant (Qulipta) 60 MG TABS; Take 60 mg by mouth in the morning If makes drowsy after a few days of starting, changed to bedtime  •  celecoxib (CeleBREX) 100 mg capsule; Take 1 capsule (100 mg total) by mouth every 12 (twelve) hours as needed for mild pain  •  prochlorperazine (COMPAZINE) 10 mg tablet; Take 1 tablet (10 mg total) by mouth every 6 (six) hours as needed for nausea    Intracranial hypertension         Raynaud's phenomenon without gangrene         Pituitary mass (HCC)         Migraine    Orders:  •  ECG 12 lead; Future  •  Ambulatory Referral to Neurology  •  rimegepant sulfate (Nurtec) 75 mg TBDP; Take 1 tablet (75 mg total) by mouth daily as needed (migraine)  •  SUMAtriptan Succinate 6 MG/0.5ML SOCT; Inject 0.5 mL (6 mg total) under the skin every 2 (two) hours as needed (migrane) Max of 2 in 24 hours    B12 deficiency    Orders:  •  Vitamin B12; Future      {Ambulatory Patient Instructions (Optional):89624}    History of Present Illness {?Quick Links Encounters * My Last Note * Last Note in Specialty * Snapshot * Since Last Visit * History :94498}  Migraine  Associated symptoms include nausea, photophobia, tinnitus and vomiting. Pertinent negatives include no dizziness, eye pain, fever, hearing loss, neck pain, numbness, seizures or weakness.      Review of Systems   Constitutional:  Negative for appetite change and fever.   HENT:  Positive for tinnitus. Negative for hearing loss, trouble swallowing and voice change.         Phonophobia   Eyes:  Positive for photophobia. Negative for pain.    Respiratory: Negative.  Negative for shortness of breath.    Cardiovascular: Negative.  Negative for palpitations.   Gastrointestinal:  Positive for nausea and vomiting.   Endocrine: Negative.  Negative for cold intolerance.   Genitourinary: Negative.  Negative for dysuria, frequency and urgency.   Musculoskeletal: Negative.  Negative for myalgias and neck pain.   Skin: Negative.  Negative for rash.   Neurological:  Positive for headaches (daily headache, 1-2 migraines a month). Negative for dizziness, tremors, seizures, syncope, facial asymmetry, speech difficulty, weakness, light-headedness and numbness.   Hematological: Negative.  Does not bruise/bleed easily.   Psychiatric/Behavioral:  Positive for sleep disturbance. Negative for confusion and hallucinations.    All other systems reviewed and are negative.   I have personally reviewed the MA's review of systems and made changes as necessary.    {Select to insert medical history sections (Optional):62551}     Objective {?Quick Links Trend Vitals * Enter New Vitals * Results Review * Timeline (Adult) * Labs * Imaging * Cardiology * Procedures * Lung Cancer Screening * Surgical eConsent :74513}  BP (!) 156/114 (BP Location: Right arm, Patient Position: Sitting, Cuff Size: Standard)   Pulse 72   Temp 98.2 °F (36.8 °C) (Temporal)   Ht 6' (1.829 m)   Wt 73 kg (161 lb)   BMI 21.84 kg/m²     Physical Exam  Neurological Exam    {Radiology Results Review (Optional):95113}    {Administrative / Billing Section (Optional):37280}

## 2025-05-01 NOTE — PATIENT INSTRUCTIONS
Preventative:  B2 400 mg a day  Magnesium 250-400 mg a day  Continue other medications  Start Qulipta 60 mg in the am.  If makes too tired change to bedtime    Abortive:  At onset of aura or migraine, take Nurtec 75 mg.  If severe or not going away add celebrex 100 mg and prochlorperazine 10 mg.  May repeat celebrex in 12 hours and prochlorperazine in 8 hours  If needed may also use and/or start with your sumatriptan injection    Please get blood work done

## 2025-05-01 NOTE — ASSESSMENT & PLAN NOTE
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  We did discuss that we will attempt to minimize and treat after we regulate her migraines which are more life altering and disabling.  Her constant daily headache is less rehabilitating at this time.

## 2025-05-01 NOTE — ASSESSMENT & PLAN NOTE
Patient is currently on amlodipine 5 mg  We did discuss that CGRP medication does have an added risk now of worsening Raynaud's.  Patient did understand this as we discussed

## 2025-05-01 NOTE — ASSESSMENT & PLAN NOTE
Preventative:  B2 400 mg a day  Magnesium 250-400 mg a day  Continue other medications  Start Qulipta 60 mg in the am.  If makes too tired change to bedtime    Abortive:  At onset of aura or migraine, take Nurtec 75 mg.  If severe or not going away add celebrex 100 mg and prochlorperazine 10 mg.  May repeat celebrex in 12 hours and prochlorperazine in 8 hours  If needed may also use and/or start with your sumatriptan injection      In addition we will get blood work and EKG.  EKG is just for baseline to evaluate for any cardiac conditions or concerns

## 2025-05-01 NOTE — ASSESSMENT & PLAN NOTE
Preventative:  B2 400 mg a day  Magnesium 250-400 mg a day  Continue other medications  Start Qulipta 60 mg in the am.  If makes too tired change to bedtime    Abortive:  At onset of aura or migraine, take Nurtec 75 mg.  If severe or not going away add celebrex 100 mg and prochlorperazine 10 mg.  May repeat celebrex in 12 hours and prochlorperazine in 8 hours  If needed may also use and/or start with your sumatriptan injection  Orders:  •  ECG 12 lead; Future  •  Atogepant (Qulipta) 60 MG TABS; Take 60 mg by mouth in the morning If makes drowsy after a few days of starting, changed to bedtime  •  celecoxib (CeleBREX) 100 mg capsule; Take 1 capsule (100 mg total) by mouth every 12 (twelve) hours as needed for mild pain  •  prochlorperazine (COMPAZINE) 10 mg tablet; Take 1 tablet (10 mg total) by mouth every 6 (six) hours as needed for nausea

## 2025-05-01 NOTE — ASSESSMENT & PLAN NOTE
Orders:  •  ECG 12 lead; Future  •  Atogepant (Qulipta) 60 MG TABS; Take 60 mg by mouth in the morning If makes drowsy after a few days of starting, changed to bedtime  •  celecoxib (CeleBREX) 100 mg capsule; Take 1 capsule (100 mg total) by mouth every 12 (twelve) hours as needed for mild pain  •  prochlorperazine (COMPAZINE) 10 mg tablet; Take 1 tablet (10 mg total) by mouth every 6 (six) hours as needed for nausea

## 2025-05-05 ENCOUNTER — TELEPHONE (OUTPATIENT)
Age: 45
End: 2025-05-05

## 2025-05-06 NOTE — TELEPHONE ENCOUNTER
Patient calling back, relayed the above message and she states she can come to the office this afternoon to get the genetic testing kit and the order form for urine PEA to be done at [facility]. Patient did receive the link for [employer] enrollment form and she completed that and sent it back through the link.     Patient was questioning the message above about the prior auth through [employer].  She wants to make sure the correct prescription plan is being used.    [facility]  ID# [id number]  BIN [id number]  PCN  [facility]  GRP

## 2025-05-08 ENCOUNTER — TELEPHONE (OUTPATIENT)
Dept: NEUROLOGY | Facility: CLINIC | Age: 45
End: 2025-05-08

## 2025-05-12 LAB — CREAT UR-SCNC: 2.61 MMOL/L (ref 1.77–23.31)

## 2025-05-13 NOTE — TELEPHONE ENCOUNTER
Qulipta PA approved through 11/9/25    Called [facility] pharmacy and left a message making them aware of the approval and for a cb if any questions

## 2025-05-14 LAB
CREAT UR-SCNC: 2.61 MMOL/L (ref 1.77–23.31)
PHOSPHOETHANOLAMINE [MASS/TIME] IN URINE: 7 MMOL/MOL CRT (ref 0.59–7.25)

## 2025-06-04 ENCOUNTER — TELEPHONE (OUTPATIENT)
Dept: ENDOCRINOLOGY | Facility: CLINIC | Age: 45
End: 2025-06-04

## 2025-06-10 ENCOUNTER — TELEPHONE (OUTPATIENT)
Dept: ENDOCRINOLOGY | Facility: CLINIC | Age: 45
End: 2025-06-10

## 2025-06-21 ENCOUNTER — HOSPITAL ENCOUNTER (OUTPATIENT)
Dept: ULTRASOUND IMAGING | Facility: HOSPITAL | Age: 45
Discharge: HOME | End: 2025-06-21
Payer: COMMERCIAL

## 2025-06-21 DIAGNOSIS — M81.0 OSTEOPOROSIS, UNSPECIFIED OSTEOPOROSIS TYPE, UNSPECIFIED PATHOLOGICAL FRACTURE PRESENCE: ICD-10-CM

## 2025-06-21 DIAGNOSIS — R74.8 LOW SERUM ALKALINE PHOSPHATASE: ICD-10-CM

## 2025-06-21 PROCEDURE — 76775 US EXAM ABDO BACK WALL LIM: CPT

## 2025-08-04 ENCOUNTER — TELEMEDICINE (OUTPATIENT)
Dept: NEUROLOGY | Facility: CLINIC | Age: 45
End: 2025-08-04
Payer: COMMERCIAL

## 2025-08-04 ENCOUNTER — TELEPHONE (OUTPATIENT)
Dept: NEUROLOGY | Facility: CLINIC | Age: 45
End: 2025-08-04

## 2025-08-04 DIAGNOSIS — G43.909 MIGRAINE: ICD-10-CM

## 2025-08-04 DIAGNOSIS — G43.E09 CHRONIC MIGRAINE WITH AURA WITHOUT STATUS MIGRAINOSUS, NOT INTRACTABLE: Primary | ICD-10-CM

## 2025-08-04 PROCEDURE — 99215 OFFICE O/P EST HI 40 MIN: CPT | Performed by: PHYSICIAN ASSISTANT

## 2025-08-04 RX ORDER — CELECOXIB 120 MG/4.8ML
120 LIQUID ORAL AS NEEDED
Qty: 28.8 ML | Refills: 11 | Status: SHIPPED | OUTPATIENT
Start: 2025-08-04

## 2025-08-04 RX ORDER — RIMEGEPANT SULFATE 75 MG/75MG
75 TABLET, ORALLY DISINTEGRATING ORAL DAILY PRN
Qty: 16 TABLET | Refills: 6 | Status: SHIPPED | OUTPATIENT
Start: 2025-08-04

## 2025-08-18 ENCOUNTER — TELEPHONE (OUTPATIENT)
Dept: ENDOCRINOLOGY | Facility: CLINIC | Age: 45
End: 2025-08-18